# Patient Record
Sex: FEMALE | Race: BLACK OR AFRICAN AMERICAN | Employment: FULL TIME | URBAN - METROPOLITAN AREA
[De-identification: names, ages, dates, MRNs, and addresses within clinical notes are randomized per-mention and may not be internally consistent; named-entity substitution may affect disease eponyms.]

---

## 2018-01-30 LAB
HBV SURFACE AG SERPL QL IA: NON REACTIVE
HIV 1+2 AB+HIV1 P24 AG SERPL QL IA: NEGATIVE
RUBELLA ABY IGG: NORMAL
T PALLIDUM IGG SER QL: NON REACTIVE

## 2018-05-30 LAB — T PALLIDUM IGG SER QL: NON REACTIVE

## 2018-08-13 LAB — GROUP B STREP PCR: NEGATIVE

## 2018-09-17 ENCOUNTER — HOSPITAL ENCOUNTER (INPATIENT)
Facility: CLINIC | Age: 38
LOS: 2 days | Discharge: HOME HEALTH | End: 2018-09-19
Attending: FAMILY MEDICINE | Admitting: FAMILY MEDICINE
Payer: COMMERCIAL

## 2018-09-17 LAB
ABO + RH BLD: NORMAL
ABO + RH BLD: NORMAL
BLD GP AB SCN SERPL QL: NORMAL
BLOOD BANK CMNT PATIENT-IMP: NORMAL
HGB BLD-MCNC: 11.6 G/DL (ref 11.7–15.7)
SPECIMEN EXP DATE BLD: NORMAL

## 2018-09-17 PROCEDURE — 25000125 ZZHC RX 250: Performed by: FAMILY MEDICINE

## 2018-09-17 PROCEDURE — 86900 BLOOD TYPING SEROLOGIC ABO: CPT | Performed by: FAMILY MEDICINE

## 2018-09-17 PROCEDURE — 86850 RBC ANTIBODY SCREEN: CPT | Performed by: FAMILY MEDICINE

## 2018-09-17 PROCEDURE — 12000029 ZZH R&B OB INTERMEDIATE

## 2018-09-17 PROCEDURE — 86780 TREPONEMA PALLIDUM: CPT | Performed by: FAMILY MEDICINE

## 2018-09-17 PROCEDURE — 85018 HEMOGLOBIN: CPT | Performed by: FAMILY MEDICINE

## 2018-09-17 PROCEDURE — 86901 BLOOD TYPING SEROLOGIC RH(D): CPT | Performed by: FAMILY MEDICINE

## 2018-09-17 PROCEDURE — 25000128 H RX IP 250 OP 636: Performed by: FAMILY MEDICINE

## 2018-09-17 RX ORDER — OXYTOCIN 10 [USP'U]/ML
10 INJECTION, SOLUTION INTRAMUSCULAR; INTRAVENOUS
Status: DISCONTINUED | OUTPATIENT
Start: 2018-09-17 | End: 2018-09-18

## 2018-09-17 RX ORDER — IBUPROFEN 800 MG/1
800 TABLET, FILM COATED ORAL
Status: DISCONTINUED | OUTPATIENT
Start: 2018-09-17 | End: 2018-09-18

## 2018-09-17 RX ORDER — FENTANYL CITRATE 50 UG/ML
50-100 INJECTION, SOLUTION INTRAMUSCULAR; INTRAVENOUS
Status: DISCONTINUED | OUTPATIENT
Start: 2018-09-17 | End: 2018-09-18

## 2018-09-17 RX ORDER — SODIUM CHLORIDE, SODIUM LACTATE, POTASSIUM CHLORIDE, CALCIUM CHLORIDE 600; 310; 30; 20 MG/100ML; MG/100ML; MG/100ML; MG/100ML
INJECTION, SOLUTION INTRAVENOUS CONTINUOUS
Status: DISCONTINUED | OUTPATIENT
Start: 2018-09-17 | End: 2018-09-18

## 2018-09-17 RX ORDER — OXYTOCIN/0.9 % SODIUM CHLORIDE 30/500 ML
1-24 PLASTIC BAG, INJECTION (ML) INTRAVENOUS CONTINUOUS
Status: DISCONTINUED | OUTPATIENT
Start: 2018-09-17 | End: 2018-09-17

## 2018-09-17 RX ORDER — ONDANSETRON 2 MG/ML
4 INJECTION INTRAMUSCULAR; INTRAVENOUS EVERY 6 HOURS PRN
Status: DISCONTINUED | OUTPATIENT
Start: 2018-09-17 | End: 2018-09-18

## 2018-09-17 RX ORDER — CARBOPROST TROMETHAMINE 250 UG/ML
250 INJECTION, SOLUTION INTRAMUSCULAR
Status: DISCONTINUED | OUTPATIENT
Start: 2018-09-17 | End: 2018-09-18

## 2018-09-17 RX ORDER — ACETAMINOPHEN 325 MG/1
650 TABLET ORAL EVERY 4 HOURS PRN
Status: DISCONTINUED | OUTPATIENT
Start: 2018-09-17 | End: 2018-09-18

## 2018-09-17 RX ORDER — METHYLERGONOVINE MALEATE 0.2 MG/ML
200 INJECTION INTRAVENOUS
Status: DISCONTINUED | OUTPATIENT
Start: 2018-09-17 | End: 2018-09-18

## 2018-09-17 RX ORDER — OXYTOCIN/0.9 % SODIUM CHLORIDE 30/500 ML
1-24 PLASTIC BAG, INJECTION (ML) INTRAVENOUS CONTINUOUS
Status: DISCONTINUED | OUTPATIENT
Start: 2018-09-17 | End: 2018-09-18

## 2018-09-17 RX ORDER — OXYCODONE AND ACETAMINOPHEN 5; 325 MG/1; MG/1
1 TABLET ORAL
Status: DISCONTINUED | OUTPATIENT
Start: 2018-09-17 | End: 2018-09-18

## 2018-09-17 RX ORDER — NALOXONE HYDROCHLORIDE 0.4 MG/ML
.1-.4 INJECTION, SOLUTION INTRAMUSCULAR; INTRAVENOUS; SUBCUTANEOUS
Status: DISCONTINUED | OUTPATIENT
Start: 2018-09-17 | End: 2018-09-18

## 2018-09-17 RX ORDER — OXYTOCIN/0.9 % SODIUM CHLORIDE 30/500 ML
100-340 PLASTIC BAG, INJECTION (ML) INTRAVENOUS CONTINUOUS PRN
Status: COMPLETED | OUTPATIENT
Start: 2018-09-17 | End: 2018-09-18

## 2018-09-17 RX ADMIN — SODIUM CHLORIDE, POTASSIUM CHLORIDE, SODIUM LACTATE AND CALCIUM CHLORIDE: 600; 310; 30; 20 INJECTION, SOLUTION INTRAVENOUS at 14:05

## 2018-09-17 RX ADMIN — OXYTOCIN-SODIUM CHLORIDE 0.9% IV SOLN 30 UNIT/500ML 1 MILLI-UNITS/MIN: 30-0.9/5 SOLUTION at 14:40

## 2018-09-17 RX ADMIN — SODIUM CHLORIDE, POTASSIUM CHLORIDE, SODIUM LACTATE AND CALCIUM CHLORIDE: 600; 310; 30; 20 INJECTION, SOLUTION INTRAVENOUS at 16:33

## 2018-09-17 NOTE — IP AVS SNAPSHOT
[facility]partum    [address]    [facility] [address]    Phone:  [phone]    Fax:  [phone]                                       After Visit Summary   9/17/2018    Hiram Page    MRN: [id number]           After Visit Summary Signature Page     I have received my discharge instructions, and my questions have been answered. I have discussed any challenges I see with this plan with the nurse or doctor.    ..........................................................................................................................................  Patient/Patient Representative Signature      ..........................................................................................................................................  Patient Representative Print Name and Relationship to Patient    ..................................................               ................................................  Date                                   Time    ..........................................................................................................................................  Reviewed by Signature/Title    ...................................................              ..............................................  Date                                               Time          22EPIC Rev 08/18

## 2018-09-17 NOTE — IP AVS SNAPSHOT
MRN:[id number]                      After Visit Summary   9/17/2018    Hiram Page    MRN: [id number]           Thank you!     Thank you for choosing [facility] for your care. Our goal is always to provide you with excellent care. Hearing back from our patients is one way we can continue to improve our services. Please take a few minutes to complete the written survey that you may receive in the mail after you visit. If you would like to speak to someone directly about your visit please contact Patient Relations at [phone]. Thank you!          Patient Information     Date Of Birth          1980        About your hospital stay     You were admitted on:  September 17, 2018 You last received care in the:  [facility]    You were discharged on:  September 19, 2018        Reason for your hospital stay       Maternity care                  Who to Call     For medical emergencies, please call 911.  For non-urgent questions about your medical care, please call your primary care provider or clinic, [phone]          Attending Provider     Provider Specialty    Fiona Millan MD Family Practice       Primary Care Provider Office Phone # Fax #    Fiona Millan [phone]      After Care Instructions     Activity       Review discharge instructions            Diet       Resume previous diet            Discharge Instructions - Postpartum visit       Schedule postpartum visit with your provider and return to clinic in 8 weeks.                  Further instructions from your care team       Postpartum Vaginal Delivery Instructions    LACTATION [phone]    Activity       Ask family and friends for help when you need it.    Do not place anything in your vagina for 6 weeks.    You are not restricted on other activities, but take it easy for a few weeks to allow your body to recover from delivery.  You are able to do any activities you feel up to  that point.    No driving until you have stopped taking your pain medications (usually two weeks after delivery).     Call your health care provider if you have any of these symptoms:       Increased pain, swelling, redness, or fluid around your stiches from an episiotomy or perineal tear.    A fever above 100.4 F (38 C) with or without chills when placing a thermometer under your tongue.    You soak a sanitary pad with blood within 1 hour, or you see blood clots larger than a golf ball.    Bleeding that lasts more than 6 weeks.    Vaginal discharge that smells bad.    Severe pain, cramping or tenderness in your lower belly area.    A need to urinate more frequently (use the toilet more often), more urgently (use the toilet very quickly), or it burns when you urinate.    Nausea and vomiting.    Redness, swelling or pain around a vein in your leg.    Problems breastfeeding or a red or painful area on your breast.    Chest pain and cough or are gasping for air.    Problems coping with sadness, anxiety, or depression.  If you have any concerns about hurting yourself or the baby, call your provider immediately.     You have questions or concerns after you return home.     Keep your hands clean:  Always wash your hands before touching your perineal area and stitches.  This helps reduce your risk of infection.  If your hands aren't dirty, you may use an alcohol hand-rub to clean your hands. Keep your nails clean and short.        Pending Results     No orders found from 9/15/2018 to 9/18/2018.            Statement of Approval     Ordered          09/19/18 0726  I have reviewed and agree with all the recommendations and orders detailed in this document.  EFFECTIVE NOW     Approved and electronically signed by:  Fiona Millan MD             Admission Information     Date & Time Provider Department Dept. Phone    9/17/2018 Fiona Millan MD [facility] Postpartum [phone]      Your Vitals Were     Blood Pressure  "Pulse Temperature Respirations          106/52 80 97.7  F (36.5  C) (Oral) 16        MyChart Information     [facility] lets you send messages to your doctor, view your test results, renew your prescriptions, schedule appointments and more. To sign up, go to [email or web address] . Click on \"Log in\" on the left side of the screen, which will take you to the Welcome page. Then click on \"Sign up Now\" on the right side of the page.     You will be asked to enter the access code listed below, as well as some personal information. Please follow the directions to create your username and password.     Your access code is: [email or web address]  Expires: 2018 11:19 AM     Your access code will  in 90 days. If you need help or a new code, please call your [facility] clinic or [phone].        Care EveryWhere ID     This is your Care EveryWhere ID. This could be used by other organizations to access your [facility] medical records  [id number]        Equal Access to Services     [facility]: Hadii marcelo broussard [place], waaxda luqadaha, qaybta kaalmagabby sims, jesus caldwell . So [facility] [phone].    ATENCIÓN: Si habla español, tiene a anderson disposición servicios gratuitos de asistencia lingüística. Llame al [phone].    We comply with applicable federal civil rights laws and [place] laws. We do not discriminate on the basis of race, color, national origin, age, disability, sex, sexual orientation, or gender identity.               Review of your medicines      CONTINUE these medicines which have NOT CHANGED        Dose / Directions    prenatal multivitamin plus iron 27-0.8 MG Tabs per tablet   Indication:  Pregnancy        Dose:  1 tablet   Take 1 tablet by mouth daily   Refills:  0                Protect others around you: Learn how to safely use, store and throw away your medicines at www.disposemymeds.org.             Medication List: This is a list of all your medications and when " to take them. Check marks below indicate your daily home schedule. Keep this list as a reference.      Medications           Morning Afternoon Evening Bedtime As Needed    prenatal multivitamin plus iron 27-0.8 MG Tabs per tablet   Take 1 tablet by mouth daily

## 2018-09-17 NOTE — PROVIDER NOTIFICATION
09/17/18 1503   Provider Notification   Provider Name/Title Dr. ANIYA Millan   Method of Notification Phone   Updated Dr. WESTON Millan on 3 min PD fior down to 80--improved with O2, repositioning, and IVF bolus. Pitocin had just been started 2 minutes prior to deceleration--turned off. Pt having occasional painful contraction.     MD OK to keep Pitocin off and monitor for now. States she can come to hospital to assess in 2ish hours. OBGYN is backup next door. Update MD as needed.

## 2018-09-17 NOTE — PLAN OF CARE
Data: [facility]: 2018  1:26 PM.  Reason for maternal/fetal assessment is postdates induction of labor. Patient is a TOLAC. HX of successful TOLAC x2. Patient is a .  Prenatal record reviewed. Pregnancy has been uncomplicated.  Gestational Age 41w5d. VSS. Fetal movement present. Patient denies leaking of vaginal fluid/rupture of membranes, vaginal bleeding, nausea, vomiting, headache, visual disturbances, epigastric or URQ pain, significant edema. Support person is present.   Action: Verbal consent for EFM. Triage assessment completed. Bill of rights reviewed.  Response: Patient verbalized agreement with plan. Will contact Dr Fiona Millan with update and further orders.    Updated Dr. Millan on arrival of pt and above info. Per MD, pt is GBS negative. FHT's category I tracing with moderate variability. Occasional crampy contraction. MD orders for low dose Pitocin. MD states OBGYN is backup. MD states she can come to assess pt in 2.5 hours. Intrapartum orders received and initiated. POC reviewed with pt and SO--all questions answered.

## 2018-09-17 NOTE — PROVIDER NOTIFICATION
09/17/18 1716   Provider Notification   Provider Name/Title Dr. ANIYA Millan   Method of Notification At Bedside   Request Evaluate in Person   Notification Reason SVE;Membrane Status     MD at bedside for AROM for scant amount of clear, blood-tinged fluid. SVE unchanged 3.5/60/-3. MD would like to wait a few hours to see if labor progresses post AROM, re-check patient in 3 hours. Hold pitocin at this time. Dr. Richardson on-call backup surgeon at 1800.

## 2018-09-17 NOTE — H&P
Hiram Page is an 37 year old female. Brought in to L&D for medical induction at 41 5/7 wks gestation. Had been scheduled for induction last Thursday but patient had decided she didn't want to proceed. Risks of sudden IUFD were discussed and risk of uterine rupture. She is a  with h/o 1st delivery being  for fetal intolerance of labor and then 2 successful vbacs the last being in . Was seen in clinic today and had normal, reactive NST and agreed to induction today. She is here with her  and her  today. Last delivery was accomplished about 5 hours after AROM was done.   No significant change in general health status based on examination of the patient, review of Nursing Admission Database and prenatal record.    EFW: 9lb 8oz      Past Medical History:   Diagnosis Date     Vitamin D deficiency        Allergies: No Known Allergies    Active Problems:    Labor and delivery, indication for care    Blood pressure 127/70, temperature 97.9  F (36.6  C), temperature source Oral, resp. rate 18, unknown if currently breastfeeding.    ROS- normal    Physical Exam  Category 1 fetal tracing. Earlier today we had attempted small amount of pitocin as we couldn't perform AROM right away due to clinic and baby didn't tolerate the pitocin. Had prolonged decel for almost 3 minutes.  Contractions- occasional spontaneous  Cervix- 3/50%/-3   AROM done- no fluid return but I could feel membranes tearing   Assessment:  Postdates pregnancy  Pt desires TOLAC    Plan:  Hopefully AROM will be enough to kick things in. Discussed that if not kicking in in a couple hours, we may need to try small dose of pitocin again. Discussed with them option for repeat  as well. She would like to really try for another  but will be agreeable to  if baby doesn't tolerate this labor. Discussed with older placenta, that might be more likely to happen. We'll continue to watch.    Fiona Millan  MD  9/17/2018

## 2018-09-18 LAB — T PALLIDUM AB SER QL: NONREACTIVE

## 2018-09-18 PROCEDURE — 72200001 ZZH LABOR CARE VAGINAL DELIVERY SINGLE

## 2018-09-18 PROCEDURE — 25000128 H RX IP 250 OP 636: Performed by: FAMILY MEDICINE

## 2018-09-18 PROCEDURE — 10907ZC DRAINAGE OF AMNIOTIC FLUID, THERAPEUTIC FROM PRODUCTS OF CONCEPTION, VIA NATURAL OR ARTIFICIAL OPENING: ICD-10-PCS | Performed by: FAMILY MEDICINE

## 2018-09-18 PROCEDURE — 25000132 ZZH RX MED GY IP 250 OP 250 PS 637: Performed by: FAMILY MEDICINE

## 2018-09-18 PROCEDURE — 40000083 ZZH STATISTIC IP LACTATION SERVICES 1-15 MIN

## 2018-09-18 PROCEDURE — 12000031 ZZH R&B OB CRITICAL

## 2018-09-18 PROCEDURE — 25000125 ZZHC RX 250: Performed by: FAMILY MEDICINE

## 2018-09-18 RX ORDER — AMOXICILLIN 250 MG
1 CAPSULE ORAL 2 TIMES DAILY PRN
Status: DISCONTINUED | OUTPATIENT
Start: 2018-09-18 | End: 2018-09-19 | Stop reason: HOSPADM

## 2018-09-18 RX ORDER — BISACODYL 10 MG
10 SUPPOSITORY, RECTAL RECTAL DAILY PRN
Status: DISCONTINUED | OUTPATIENT
Start: 2018-09-20 | End: 2018-09-19 | Stop reason: HOSPADM

## 2018-09-18 RX ORDER — LANOLIN 100 %
OINTMENT (GRAM) TOPICAL
Status: DISCONTINUED | OUTPATIENT
Start: 2018-09-18 | End: 2018-09-19 | Stop reason: HOSPADM

## 2018-09-18 RX ORDER — NALOXONE HYDROCHLORIDE 0.4 MG/ML
.1-.4 INJECTION, SOLUTION INTRAMUSCULAR; INTRAVENOUS; SUBCUTANEOUS
Status: DISCONTINUED | OUTPATIENT
Start: 2018-09-18 | End: 2018-09-19 | Stop reason: HOSPADM

## 2018-09-18 RX ORDER — AMOXICILLIN 250 MG
2 CAPSULE ORAL 2 TIMES DAILY PRN
Status: DISCONTINUED | OUTPATIENT
Start: 2018-09-18 | End: 2018-09-19 | Stop reason: HOSPADM

## 2018-09-18 RX ORDER — HYDROCORTISONE 2.5 %
CREAM (GRAM) TOPICAL 3 TIMES DAILY PRN
Status: DISCONTINUED | OUTPATIENT
Start: 2018-09-18 | End: 2018-09-19 | Stop reason: HOSPADM

## 2018-09-18 RX ORDER — OXYTOCIN/0.9 % SODIUM CHLORIDE 30/500 ML
100 PLASTIC BAG, INJECTION (ML) INTRAVENOUS CONTINUOUS
Status: DISCONTINUED | OUTPATIENT
Start: 2018-09-18 | End: 2018-09-19 | Stop reason: CLARIF

## 2018-09-18 RX ORDER — IBUPROFEN 800 MG/1
800 TABLET, FILM COATED ORAL EVERY 6 HOURS PRN
Status: DISCONTINUED | OUTPATIENT
Start: 2018-09-18 | End: 2018-09-19 | Stop reason: HOSPADM

## 2018-09-18 RX ORDER — OXYTOCIN 10 [USP'U]/ML
10 INJECTION, SOLUTION INTRAMUSCULAR; INTRAVENOUS
Status: DISCONTINUED | OUTPATIENT
Start: 2018-09-18 | End: 2018-09-19 | Stop reason: HOSPADM

## 2018-09-18 RX ORDER — OXYTOCIN/0.9 % SODIUM CHLORIDE 30/500 ML
340 PLASTIC BAG, INJECTION (ML) INTRAVENOUS CONTINUOUS PRN
Status: DISCONTINUED | OUTPATIENT
Start: 2018-09-18 | End: 2018-09-19 | Stop reason: HOSPADM

## 2018-09-18 RX ORDER — ACETAMINOPHEN 325 MG/1
650 TABLET ORAL EVERY 4 HOURS PRN
Status: DISCONTINUED | OUTPATIENT
Start: 2018-09-18 | End: 2018-09-19 | Stop reason: HOSPADM

## 2018-09-18 RX ADMIN — FENTANYL CITRATE 100 MCG: 50 INJECTION INTRAMUSCULAR; INTRAVENOUS at 01:30

## 2018-09-18 RX ADMIN — SENNOSIDES AND DOCUSATE SODIUM 1 TABLET: 8.6; 5 TABLET ORAL at 18:53

## 2018-09-18 RX ADMIN — SODIUM CHLORIDE, POTASSIUM CHLORIDE, SODIUM LACTATE AND CALCIUM CHLORIDE: 600; 310; 30; 20 INJECTION, SOLUTION INTRAVENOUS at 00:50

## 2018-09-18 RX ADMIN — IBUPROFEN 800 MG: 800 TABLET ORAL at 18:53

## 2018-09-18 RX ADMIN — FENTANYL CITRATE 100 MCG: 50 INJECTION INTRAMUSCULAR; INTRAVENOUS at 00:25

## 2018-09-18 RX ADMIN — IBUPROFEN 800 MG: 800 TABLET ORAL at 03:39

## 2018-09-18 RX ADMIN — OXYTOCIN-SODIUM CHLORIDE 0.9% IV SOLN 30 UNIT/500ML 340 ML/HR: 30-0.9/5 SOLUTION at 02:41

## 2018-09-18 NOTE — PROVIDER NOTIFICATION
09/17/18 1945   Provider Notification   Provider Name/Title Dr Millan   Method of Notification Phone   Request Evaluate - Remote   Notification Reason Labor Status;Status Update

## 2018-09-18 NOTE — L&D DELIVERY NOTE
Delivery Summary with Adams  DELIVERY SUMMARY    Hiram Page MRN# [id number]   Age: 37 year old YOB: 1980     ASSESSMENT & PLAN: normal . Anticipate discharge to home in 2 days.    Adams Assessment Tool Data    Gestational Age:  Gestational Age: 41w6d     Maternal temperature range:  Temp  Av.2  F (36.8  C)  Min: 97.9  F (36.6  C)  Max: 98.6  F (37  C)    Membranes ruptured for:   9h 12m     GBS status:  Lab Results   Component Value Date    GBS negative 06/15/2016            Labor Event Times    Labor onset date:  18 Onset time:   1:45 AM   Dilation complete date:  18 Complete time:   2:20 AM   Start pushing date/time:  2018 0230            Labor Length    1st Stage (hrs):  0 (min):  35   2nd Stage (hrs):  0 (min):  16      Labor Events     labor?:  No    steroids:  None   Labor Type:  Induction, AROM      Antibiotics received during labor?:  No      Rupture date/time: 18 1724   Rupture type:  Artificial Rupture of Membranes   Fluid color:  Clear      Induction:  AROM   Induction date/time:     Cervical ripening date/time:     Indications for induction:  Post-term Gestation      1:1 continuous labor support provided by?:  ANAND glynn          Delivery/Placenta Date and Time    Delivery Date:  18 Delivery Time:   2:36 AM   Oxytocin given at the time of delivery:  after delivery of baby      Vaginal Counts    Initial count performed by 2 team members:   Two Team Members   Dr. Monty MOSHER RN          [employer]   Initial counts 2  5    Added to count       Final counts 2  5       Placed during labor Accounted for at the end of labor   NA    Yes    NA       Final count performed by 2 team members:   Two Team Members   Dr. Monty MOSHER RN         Final count correct?:  Yes         Apgars    Living status:  Living    1 Minute 5 Minute 10 Minute 15 Minute 20 Minute   Skin color: 0  1       Heart rate: 2  2      "  Reflex irritability: 2  2       Muscle tone: 2  2       Respiratory effort: 1  2       Total: 7  9          Apgars assigned by:  RONNIE ALVA,CNP      Cord    Vessels:  3 Vessels    Cord Blood Disposition:  Lab          [place] Resuscitation    Methods:  Suctioning       Care at Delivery:  Called to attend vaginal delivery of this 40 6/7 week infant by Dr. Millan secondary to fetal heart rate decelerations.  Infant was born and was placed on mother's abdomen.  Initially made a gasp, but did not make more respiratory effort.  Appeared to have good tone and was being dried and stimulated; was brought to prewarmed radiant warmer after 1 min 30 seconds of age when cord was clamped. Continued to stimulate and he began to have vigorous cry.  Was deep suctioned for course breath sounds and had thick meconium stained fluid.  Continued to be vigorous and color was pink.  After 5 minutes of age, was left in the care of the labor and delivery RN.      Latonya Sher, NNP-BC 2018 2:53 AM     Output in Delivery Room:  Stool      [place] Measurements    Weight:  9 lb 2.4 oz Length:  1' 8.5\"   Head circumference:  35.6 cm       Labor Events and Shoulder Dystocia    Fetal Tracing Prior to Delivery:  Category 2   Shoulder dystocia present?:  Neg            Delivery (Maternal) (Provider to Complete) (218354)    Episiotomy:  None   Perineal lacerations:  1st Repaired?:  No   Vaginal laceration?:  No    Cervical laceration?:  No    Est. blood loss (mL):  100         Mother's Information  Mother: Hiram Page #[id number]    Start of Mother's Information     IO Blood Loss  18 0145 - 18 0259    Mom's I/O Activity            End of Mother's Information  Mother: Hiram Page #[id number]            Delivery - Provider to Complete ([id number])    Delivering clinician:  ROBYN MILLAN   Attempted Delivery Types (Choose all that apply):  Spontaneous Vaginal Delivery   Delivery Type (Choose the 1 that will go to the " Birth History):  Vaginal, Spontaneous Delivery                           Placenta    Delayed Cord Clamping:  Done   Removal:  Spontaneous   Disposition:  Hospital disposal      Anesthesia    Method:  INTRAVENOUS REGIONAL         Presentation and Position    Presentation:  Vertex    Occiput Anterior                    Fiona Millan MD

## 2018-09-18 NOTE — PLAN OF CARE
Problem: Patient Care Overview  Goal: Plan of Care/Patient Progress Review  Outcome: No Change  Pt able to get some rest between cares w/  in NBN shortly after transfer to unit.  States she is comfortable; denies need for further pain medications at this time.  Using ice pack to divina area.  Ambulating independently; voiding w/o difficulty.  SL patent.  FOB present and supportive.  Continue to monitor.

## 2018-09-18 NOTE — PROVIDER NOTIFICATION
09/18/18 0200   Provider Notification   Provider Name/Title Dr. WESTON Millan   Method of Notification Phone   Request Attend Delivery   Notification Reason Status Update   Updated with patient's involuntary pushing with contractions.  Will come to hospital for delivery

## 2018-09-18 NOTE — PROVIDER NOTIFICATION
09/17/18 2135   Provider Notification   Provider Name/Title Dr. ANIYA Millan   Method of Notification Phone   Request Evaluate - Remote   Notification Reason Status Update;SVE;Uterine Activity     MD updated on unchanged SVE 3.5/60/-3. Pitocin started at 2105 and IUPC placed per physician order. UC every 5-9 minutes per IUPC and currently have a category 1 FHT. Will update MD with any changes or fetal intolerance.

## 2018-09-18 NOTE — PLAN OF CARE
Problem: Patient Care Overview  Goal: Plan of Care/Patient Progress Review  Patient up and around room voiding without complications   Denies pain -working on breastfeeding baby

## 2018-09-18 NOTE — PLAN OF CARE
Problem: Postpartum (Vaginal Delivery) (Adult,Obstetrics,Pediatric)  Goal: Signs and Symptoms of Listed Potential Problems Will be Absent, Minimized or Managed (Postpartum)  Signs and symptoms of listed potential problems will be absent, minimized or managed by discharge/transition of care (reference Postpartum (Vaginal Delivery) (Adult,Obstetrics,Pediatric) CPG).   Outcome: Improving  Patient is independent with her cares, she is up to the bathroom without problems, voiding with difficulty. She denies pain, I reminded her when she could have the next Ibuprofen, and reviewed it on the white board. Patient is a experienced mother and is comfortable with infant cares. The father of the baby came and brought mother food of her choice. I made sure the patient would not want or need a  and both agreed they understand completely.

## 2018-09-18 NOTE — LACTATION NOTE
REY to see patient.  Her baby has been nursing well.  She has no questions or concerns and reports successfully nursing her other children.  She is also using formula per her preference.

## 2018-09-18 NOTE — PLAN OF CARE
Data: Hiram Page transferred to 451 via wheelchair at 0451. Baby transferred via parent's arms.  Action: Receiving unit notified of transfer: Yes. Patient and family notified of room change. Report given to ANAND Bernabe at 0456. Belongings sent to receiving unit. Accompanied by Registered Nurse. Oriented patient to surroundings. Call light within reach. ID bands double-checked with receiving RN.  Response: Patient tolerated transfer and is stable.

## 2018-09-19 VITALS
TEMPERATURE: 97.7 F | RESPIRATION RATE: 16 BRPM | DIASTOLIC BLOOD PRESSURE: 52 MMHG | SYSTOLIC BLOOD PRESSURE: 106 MMHG | HEART RATE: 80 BPM

## 2018-09-19 PROCEDURE — 90686 IIV4 VACC NO PRSV 0.5 ML IM: CPT | Performed by: FAMILY MEDICINE

## 2018-09-19 PROCEDURE — 25000128 H RX IP 250 OP 636: Performed by: FAMILY MEDICINE

## 2018-09-19 PROCEDURE — 25000132 ZZH RX MED GY IP 250 OP 250 PS 637: Performed by: FAMILY MEDICINE

## 2018-09-19 RX ADMIN — INFLUENZA A VIRUS A/MICHIGAN/45/2015 X-275 (H1N1) ANTIGEN (FORMALDEHYDE INACTIVATED), INFLUENZA A VIRUS A/SINGAPORE/INFIMH-16-0019/2016 IVR-186 (H3N2) ANTIGEN (FORMALDEHYDE INACTIVATED), INFLUENZA B VIRUS B/PHUKET/3073/2013 ANTIGEN (FORMALDEHYDE INACTIVATED), AND INFLUENZA B VIRUS B/MARYLAND/15/2016 BX-69A ANTIGEN (FORMALDEHYDE INACTIVATED) 0.5 ML: 15; 15; 15; 15 INJECTION, SUSPENSION INTRAMUSCULAR at 10:19

## 2018-09-19 RX ADMIN — SENNOSIDES AND DOCUSATE SODIUM 1 TABLET: 8.6; 5 TABLET ORAL at 07:51

## 2018-09-19 NOTE — PLAN OF CARE
Problem: Patient Care Overview  Goal: Plan of Care/Patient Progress Review  Outcome: No Change  Pt doing well and independent with self and baby cares. Ready to discharge this afternoon when  arrives. Will have home care as early discharge. Breast and bottle feeding.

## 2018-09-19 NOTE — PLAN OF CARE
Problem: Patient Care Overview  Goal: Plan of Care/Patient Progress Review  Outcome: Improving  Patient stable this shift. Motrin given once this evening. Resting between visitors and feeds.

## 2018-09-19 NOTE — DISCHARGE SUMMARY
Doing well.  Pain is adequately controlled.  No fevers.  No history of foul-smelling vaginal discharge.  Good appetite.  Denies chest pain, shortness of breath, nausea or vomiting.  Vaginal bleeding is similar to a heavy menstrual flow.  Breastfeeding well. Home today. F/up in 8 weeks. iFona Millan MD

## 2018-09-19 NOTE — DOWNTIME EVENT NOTE
The EMR was down for 2.5 hours on 9/19/2018.    KEI Don was responsible for completing the paper charting during this time period.     The following information was re-entered into the system by Florecita Stone: Flowsheet data    The following information will remain in the paper chart: none    Florecita Stone  9/19/2018

## 2018-09-19 NOTE — DISCHARGE INSTRUCTIONS
Postpartum Vaginal Delivery Instructions    LACTATION [phone]    Activity       Ask family and friends for help when you need it.    Do not place anything in your vagina for 6 weeks.    You are not restricted on other activities, but take it easy for a few weeks to allow your body to recover from delivery.  You are able to do any activities you feel up to that point.    No driving until you have stopped taking your pain medications (usually two weeks after delivery).     Call your health care provider if you have any of these symptoms:       Increased pain, swelling, redness, or fluid around your stiches from an episiotomy or perineal tear.    A fever above 100.4 F (38 C) with or without chills when placing a thermometer under your tongue.    You soak a sanitary pad with blood within 1 hour, or you see blood clots larger than a golf ball.    Bleeding that lasts more than 6 weeks.    Vaginal discharge that smells bad.    Severe pain, cramping or tenderness in your lower belly area.    A need to urinate more frequently (use the toilet more often), more urgently (use the toilet very quickly), or it burns when you urinate.    Nausea and vomiting.    Redness, swelling or pain around a vein in your leg.    Problems breastfeeding or a red or painful area on your breast.    Chest pain and cough or are gasping for air.    Problems coping with sadness, anxiety, or depression.  If you have any concerns about hurting yourself or the baby, call your provider immediately.     You have questions or concerns after you return home.     Keep your hands clean:  Always wash your hands before touching your perineal area and stitches.  This helps reduce your risk of infection.  If your hands aren't dirty, you may use an alcohol hand-rub to clean your hands. Keep your nails clean and short.

## 2018-09-19 NOTE — PLAN OF CARE
Problem: Patient Care Overview  Goal: Plan of Care/Patient Progress Review  Outcome: Improving  Pt able to get some rest between cares w/  rooming-in for feedings.  Declining pain medications at this time.  Voiding w/o difficulty.  Independent w/ self and baby cares.  FOB present and supportive.  Anticipate discharge later today.

## 2021-03-09 LAB
HEPATITIS B SURFACE ANTIGEN (EXTERNAL): NEGATIVE
HIV1+2 AB SERPL QL IA: NEGATIVE
RUBELLA ANTIBODY IGG (EXTERNAL): NORMAL

## 2021-08-19 LAB — GROUP B STREPTOCOCCUS (EXTERNAL): NEGATIVE

## 2021-09-10 ENCOUNTER — HOSPITAL ENCOUNTER (INPATIENT)
Facility: CLINIC | Age: 41
LOS: 2 days | Discharge: HOME | End: 2021-09-12
Attending: OBSTETRICS & GYNECOLOGY | Admitting: OBSTETRICS & GYNECOLOGY
Payer: COMMERCIAL

## 2021-09-10 LAB
ABO/RH(D): NORMAL
ANTIBODY SCREEN: NEGATIVE
HGB BLD-MCNC: 11.6 G/DL (ref 11.7–15.7)
SARS-COV-2 RNA RESP QL NAA+PROBE: NEGATIVE
SPECIMEN EXPIRATION DATE: NORMAL

## 2021-09-10 PROCEDURE — 85018 HEMOGLOBIN: CPT | Performed by: OBSTETRICS & GYNECOLOGY

## 2021-09-10 PROCEDURE — 722N000001 HC LABOR CARE VAGINAL DELIVERY SINGLE

## 2021-09-10 PROCEDURE — 250N000011 HC RX IP 250 OP 636: Performed by: OBSTETRICS & GYNECOLOGY

## 2021-09-10 PROCEDURE — 87635 SARS-COV-2 COVID-19 AMP PRB: CPT | Performed by: OBSTETRICS & GYNECOLOGY

## 2021-09-10 PROCEDURE — 258N000003 HC RX IP 258 OP 636: Performed by: OBSTETRICS & GYNECOLOGY

## 2021-09-10 PROCEDURE — 250N000009 HC RX 250: Performed by: OBSTETRICS & GYNECOLOGY

## 2021-09-10 PROCEDURE — 86780 TREPONEMA PALLIDUM: CPT | Performed by: OBSTETRICS & GYNECOLOGY

## 2021-09-10 PROCEDURE — 86901 BLOOD TYPING SEROLOGIC RH(D): CPT | Performed by: OBSTETRICS & GYNECOLOGY

## 2021-09-10 PROCEDURE — 120N000001 HC R&B MED SURG/OB

## 2021-09-10 PROCEDURE — 3E033VJ INTRODUCTION OF OTHER HORMONE INTO PERIPHERAL VEIN, PERCUTANEOUS APPROACH: ICD-10-PCS | Performed by: OBSTETRICS & GYNECOLOGY

## 2021-09-10 PROCEDURE — 10907ZC DRAINAGE OF AMNIOTIC FLUID, THERAPEUTIC FROM PRODUCTS OF CONCEPTION, VIA NATURAL OR ARTIFICIAL OPENING: ICD-10-PCS | Performed by: OBSTETRICS & GYNECOLOGY

## 2021-09-10 PROCEDURE — 36415 COLL VENOUS BLD VENIPUNCTURE: CPT | Performed by: OBSTETRICS & GYNECOLOGY

## 2021-09-10 RX ORDER — IBUPROFEN 800 MG/1
800 TABLET, FILM COATED ORAL EVERY 6 HOURS PRN
Status: DISCONTINUED | OUTPATIENT
Start: 2021-09-10 | End: 2021-09-12 | Stop reason: HOSPADM

## 2021-09-10 RX ORDER — MISOPROSTOL 200 UG/1
400 TABLET ORAL
Status: DISCONTINUED | OUTPATIENT
Start: 2021-09-10 | End: 2021-09-12 | Stop reason: HOSPADM

## 2021-09-10 RX ORDER — MISOPROSTOL 200 UG/1
400 TABLET ORAL
Status: DISCONTINUED | OUTPATIENT
Start: 2021-09-10 | End: 2021-09-10 | Stop reason: HOSPADM

## 2021-09-10 RX ORDER — NALOXONE HYDROCHLORIDE 0.4 MG/ML
0.2 INJECTION, SOLUTION INTRAMUSCULAR; INTRAVENOUS; SUBCUTANEOUS
Status: DISCONTINUED | OUTPATIENT
Start: 2021-09-10 | End: 2021-09-12 | Stop reason: HOSPADM

## 2021-09-10 RX ORDER — NALOXONE HYDROCHLORIDE 0.4 MG/ML
0.4 INJECTION, SOLUTION INTRAMUSCULAR; INTRAVENOUS; SUBCUTANEOUS
Status: DISCONTINUED | OUTPATIENT
Start: 2021-09-10 | End: 2021-09-12 | Stop reason: HOSPADM

## 2021-09-10 RX ORDER — OXYTOCIN/0.9 % SODIUM CHLORIDE 30/500 ML
1-24 PLASTIC BAG, INJECTION (ML) INTRAVENOUS CONTINUOUS
Status: DISCONTINUED | OUTPATIENT
Start: 2021-09-10 | End: 2021-09-10 | Stop reason: HOSPADM

## 2021-09-10 RX ORDER — PROCHLORPERAZINE 25 MG
25 SUPPOSITORY, RECTAL RECTAL EVERY 12 HOURS PRN
Status: DISCONTINUED | OUTPATIENT
Start: 2021-09-10 | End: 2021-09-10 | Stop reason: HOSPADM

## 2021-09-10 RX ORDER — METOCLOPRAMIDE HYDROCHLORIDE 5 MG/ML
10 INJECTION INTRAMUSCULAR; INTRAVENOUS EVERY 6 HOURS PRN
Status: DISCONTINUED | OUTPATIENT
Start: 2021-09-10 | End: 2021-09-10 | Stop reason: HOSPADM

## 2021-09-10 RX ORDER — CARBOPROST TROMETHAMINE 250 UG/ML
250 INJECTION, SOLUTION INTRAMUSCULAR
Status: DISCONTINUED | OUTPATIENT
Start: 2021-09-10 | End: 2021-09-12 | Stop reason: HOSPADM

## 2021-09-10 RX ORDER — NALOXONE HYDROCHLORIDE 0.4 MG/ML
0.2 INJECTION, SOLUTION INTRAMUSCULAR; INTRAVENOUS; SUBCUTANEOUS
Status: DISCONTINUED | OUTPATIENT
Start: 2021-09-10 | End: 2021-09-10 | Stop reason: HOSPADM

## 2021-09-10 RX ORDER — FENTANYL CITRATE 50 UG/ML
50-100 INJECTION, SOLUTION INTRAMUSCULAR; INTRAVENOUS
Status: DISCONTINUED | OUTPATIENT
Start: 2021-09-10 | End: 2021-09-10 | Stop reason: HOSPADM

## 2021-09-10 RX ORDER — KETOROLAC TROMETHAMINE 30 MG/ML
30 INJECTION, SOLUTION INTRAMUSCULAR; INTRAVENOUS
Status: DISCONTINUED | OUTPATIENT
Start: 2021-09-10 | End: 2021-09-12 | Stop reason: HOSPADM

## 2021-09-10 RX ORDER — OXYTOCIN 10 [USP'U]/ML
10 INJECTION, SOLUTION INTRAMUSCULAR; INTRAVENOUS
Status: DISCONTINUED | OUTPATIENT
Start: 2021-09-10 | End: 2021-09-12 | Stop reason: HOSPADM

## 2021-09-10 RX ORDER — SODIUM CHLORIDE, SODIUM LACTATE, POTASSIUM CHLORIDE, CALCIUM CHLORIDE 600; 310; 30; 20 MG/100ML; MG/100ML; MG/100ML; MG/100ML
INJECTION, SOLUTION INTRAVENOUS CONTINUOUS
Status: DISCONTINUED | OUTPATIENT
Start: 2021-09-10 | End: 2021-09-12 | Stop reason: HOSPADM

## 2021-09-10 RX ORDER — LIDOCAINE 40 MG/G
CREAM TOPICAL
Status: DISCONTINUED | OUTPATIENT
Start: 2021-09-10 | End: 2021-09-10 | Stop reason: HOSPADM

## 2021-09-10 RX ORDER — ONDANSETRON 4 MG/1
4 TABLET, ORALLY DISINTEGRATING ORAL EVERY 6 HOURS PRN
Status: DISCONTINUED | OUTPATIENT
Start: 2021-09-10 | End: 2021-09-10 | Stop reason: HOSPADM

## 2021-09-10 RX ORDER — BISACODYL 10 MG
10 SUPPOSITORY, RECTAL RECTAL DAILY PRN
Status: DISCONTINUED | OUTPATIENT
Start: 2021-09-10 | End: 2021-09-12 | Stop reason: HOSPADM

## 2021-09-10 RX ORDER — NALOXONE HYDROCHLORIDE 0.4 MG/ML
0.4 INJECTION, SOLUTION INTRAMUSCULAR; INTRAVENOUS; SUBCUTANEOUS
Status: DISCONTINUED | OUTPATIENT
Start: 2021-09-10 | End: 2021-09-10 | Stop reason: HOSPADM

## 2021-09-10 RX ORDER — OXYTOCIN/0.9 % SODIUM CHLORIDE 30/500 ML
340 PLASTIC BAG, INJECTION (ML) INTRAVENOUS CONTINUOUS PRN
Status: DISCONTINUED | OUTPATIENT
Start: 2021-09-10 | End: 2021-09-12 | Stop reason: HOSPADM

## 2021-09-10 RX ORDER — ONDANSETRON 2 MG/ML
4 INJECTION INTRAMUSCULAR; INTRAVENOUS EVERY 6 HOURS PRN
Status: DISCONTINUED | OUTPATIENT
Start: 2021-09-10 | End: 2021-09-10 | Stop reason: HOSPADM

## 2021-09-10 RX ORDER — METHYLERGONOVINE MALEATE 0.2 MG/ML
200 INJECTION INTRAVENOUS
Status: DISCONTINUED | OUTPATIENT
Start: 2021-09-10 | End: 2021-09-10 | Stop reason: HOSPADM

## 2021-09-10 RX ORDER — OXYTOCIN 10 [USP'U]/ML
10 INJECTION, SOLUTION INTRAMUSCULAR; INTRAVENOUS
Status: DISCONTINUED | OUTPATIENT
Start: 2021-09-10 | End: 2021-09-10 | Stop reason: HOSPADM

## 2021-09-10 RX ORDER — MISOPROSTOL 200 UG/1
800 TABLET ORAL
Status: DISCONTINUED | OUTPATIENT
Start: 2021-09-10 | End: 2021-09-10 | Stop reason: HOSPADM

## 2021-09-10 RX ORDER — OXYCODONE HYDROCHLORIDE 5 MG/1
5 TABLET ORAL EVERY 4 HOURS PRN
Status: DISCONTINUED | OUTPATIENT
Start: 2021-09-10 | End: 2021-09-12 | Stop reason: HOSPADM

## 2021-09-10 RX ORDER — METHYLERGONOVINE MALEATE 0.2 MG/ML
200 INJECTION INTRAVENOUS
Status: DISCONTINUED | OUTPATIENT
Start: 2021-09-10 | End: 2021-09-12 | Stop reason: HOSPADM

## 2021-09-10 RX ORDER — TRANEXAMIC ACID 10 MG/ML
1 INJECTION, SOLUTION INTRAVENOUS EVERY 30 MIN PRN
Status: DISCONTINUED | OUTPATIENT
Start: 2021-09-10 | End: 2021-09-12 | Stop reason: HOSPADM

## 2021-09-10 RX ORDER — OXYTOCIN/0.9 % SODIUM CHLORIDE 30/500 ML
340 PLASTIC BAG, INJECTION (ML) INTRAVENOUS CONTINUOUS PRN
Status: DISCONTINUED | OUTPATIENT
Start: 2021-09-10 | End: 2021-09-10 | Stop reason: HOSPADM

## 2021-09-10 RX ORDER — IBUPROFEN 600 MG/1
600 TABLET, FILM COATED ORAL
Status: DISCONTINUED | OUTPATIENT
Start: 2021-09-10 | End: 2021-09-12 | Stop reason: HOSPADM

## 2021-09-10 RX ORDER — MODIFIED LANOLIN
OINTMENT (GRAM) TOPICAL
Status: DISCONTINUED | OUTPATIENT
Start: 2021-09-10 | End: 2021-09-12 | Stop reason: HOSPADM

## 2021-09-10 RX ORDER — METOCLOPRAMIDE 10 MG/1
10 TABLET ORAL EVERY 6 HOURS PRN
Status: DISCONTINUED | OUTPATIENT
Start: 2021-09-10 | End: 2021-09-10 | Stop reason: HOSPADM

## 2021-09-10 RX ORDER — HYDROCORTISONE 2.5 %
CREAM (GRAM) TOPICAL 3 TIMES DAILY PRN
Status: DISCONTINUED | OUTPATIENT
Start: 2021-09-10 | End: 2021-09-12 | Stop reason: HOSPADM

## 2021-09-10 RX ORDER — OXYTOCIN/0.9 % SODIUM CHLORIDE 30/500 ML
100-340 PLASTIC BAG, INJECTION (ML) INTRAVENOUS CONTINUOUS PRN
Status: DISCONTINUED | OUTPATIENT
Start: 2021-09-10 | End: 2021-09-12 | Stop reason: HOSPADM

## 2021-09-10 RX ORDER — CARBOPROST TROMETHAMINE 250 UG/ML
250 INJECTION, SOLUTION INTRAMUSCULAR
Status: DISCONTINUED | OUTPATIENT
Start: 2021-09-10 | End: 2021-09-10 | Stop reason: HOSPADM

## 2021-09-10 RX ORDER — TRANEXAMIC ACID 10 MG/ML
1 INJECTION, SOLUTION INTRAVENOUS EVERY 30 MIN PRN
Status: DISCONTINUED | OUTPATIENT
Start: 2021-09-10 | End: 2021-09-10 | Stop reason: HOSPADM

## 2021-09-10 RX ORDER — MISOPROSTOL 200 UG/1
800 TABLET ORAL
Status: DISCONTINUED | OUTPATIENT
Start: 2021-09-10 | End: 2021-09-12 | Stop reason: HOSPADM

## 2021-09-10 RX ORDER — PROCHLORPERAZINE MALEATE 10 MG
10 TABLET ORAL EVERY 6 HOURS PRN
Status: DISCONTINUED | OUTPATIENT
Start: 2021-09-10 | End: 2021-09-10 | Stop reason: HOSPADM

## 2021-09-10 RX ORDER — ACETAMINOPHEN 325 MG/1
650 TABLET ORAL EVERY 4 HOURS PRN
Status: DISCONTINUED | OUTPATIENT
Start: 2021-09-10 | End: 2021-09-12 | Stop reason: HOSPADM

## 2021-09-10 RX ORDER — DOCUSATE SODIUM 100 MG/1
100 CAPSULE, LIQUID FILLED ORAL DAILY
Status: DISCONTINUED | OUTPATIENT
Start: 2021-09-11 | End: 2021-09-12 | Stop reason: HOSPADM

## 2021-09-10 RX ADMIN — Medication 2 MILLI-UNITS/MIN: at 17:51

## 2021-09-10 RX ADMIN — FENTANYL CITRATE 100 MCG: 50 INJECTION, SOLUTION INTRAMUSCULAR; INTRAVENOUS at 20:45

## 2021-09-10 RX ADMIN — SODIUM CHLORIDE, POTASSIUM CHLORIDE, SODIUM LACTATE AND CALCIUM CHLORIDE: 600; 310; 30; 20 INJECTION, SOLUTION INTRAVENOUS at 18:00

## 2021-09-10 RX ADMIN — Medication 4 MILLI-UNITS/MIN: at 18:30

## 2021-09-10 RX ADMIN — SODIUM CHLORIDE, POTASSIUM CHLORIDE, SODIUM LACTATE AND CALCIUM CHLORIDE 500 ML: 600; 310; 30; 20 INJECTION, SOLUTION INTRAVENOUS at 18:32

## 2021-09-10 NOTE — PLAN OF CARE
Data: [facility] presented [facility]: 9/10/2021  4:51 PM.  Reason for maternal/fetal assessment is follow up from [facility] .  Patient is a .  Prenatal record reviewed. Pregnancy has been uncomplicated..  Gestational Age 40w6d. VSS. Fetal movement active. Patient denies uterine contractions, leaking of vaginal fluid/rupture of membranes, vaginal bleeding, abdominal pain, pelvic pressure, nausea, vomiting, headache, visual disturbances, epigastric or URQ pain, significant edema. Support person () is present.   Action: Verbal consent for EFM. Triage assessment completed. Bill of rights reviewed.  Response: Patient verbalized agreement with plan. Will contact Dr Pooja Lynch with update and for further orders.

## 2021-09-10 NOTE — H&P
"  September 10, 2021    Hiram Page  [id number]      OB Admit History & Physical      Ms. Page  is here due to non reassuring  testing with a BPP score of 4/8.    She was called today due to BPP score and recommended to direct herself immediately to L&D. Pt was skeptical per notes. Please see care everywhere for further details.     Upon encounter pt states she is amenable for induction based on discussion regarding poor BPP score of 4/8 and the high risk for fetal stress/death. Denies any leaking of fluid, abnormal vaginal discharge. States +FM. She is requesting TOLAC since she has hx of 3 prior successful 's.       No LMP recorded. Patient is pregnant.   Her Estimated Date of Delivery: Sep 4, 2021, making her 40w6d.      Estimated body mass index is 29.95 kg/m  as calculated from the following:    Height as of 16: 1.651 m (5' 5\").    Weight as of 16: 81.6 kg (180 lb).     Pregnancy complications:  -Thrombocytopenia  - History of CS x1,  x3  - AMA  - Scant prenatal care   - Poor pregnancy dating  - Insulin resistance  - Son with autism  - History of LGA infant     She is a 40 year old   Her OB history:   OB History    Para Term  AB Living   6 4 4 0 0 4   SAB TAB Ectopic Multiple Live Births   0 0 0 0 4      # Outcome Date GA Lbr Zac/2nd Weight Sex Delivery Anes PTL Lv   6 Current            5 Term 18 41w6d 00:35 / 00:16 4.15 kg (9 lb 2.4 oz) M Vag-Spont IV N SAFIA      Name: REYNALDO PAGE1 HIRAM      Apgar1: 7  Apgar5: 9   4 Term 16 41w3d 12:50 / 01:02 4.38 kg (9 lb 10.5 oz) M  IV  SAFIA      Name: TRISTIAN BANERJEE      Apgar1: 2  Apgar5: 5   3             2 Term         SAFIA   1 Term         SAFIA            Past Medical History:   Diagnosis Date     Vitamin D deficiency           Past Surgical History:   Procedure Laterality Date      SECTION           No current outpatient medications on file.       Allergies: Patient has no " known allergies.      REVIEW OF SYSTEMS:  NEUROLOGIC:  Negative  EYES:  Negative  ENT:  Negative  GI:  Negative  :  Negative  GYN:  Negative  CV:  Negative  PULMONARY:  Negative  MUSCULOSKELETAL:  Negative  PSYCH:  Negative      Social History     Socioeconomic History     Marital status:      Spouse name: Not on file     Number of children: Not on file     Years of education: Not on file     Highest education level: Not on file   Occupational History     Not on file   Tobacco Use     Smoking status: Never Smoker     Smokeless tobacco: Never Used   Substance and Sexual Activity     Alcohol use: No     Drug use: No     Sexual activity: Yes   Other Topics Concern     Not on file   Social History Narrative     Not on file     Social Determinants of Health     Financial Resource Strain:      Difficulty of Paying Living Expenses:    Food Insecurity:      Worried About Running Out of Food in the Last Year:      Ran Out of Food in the Last Year:    Transportation Needs:      Lack of Transportation (Medical):      Lack of Transportation (Non-Medical):    Physical Activity:      Days of Exercise per Week:      Minutes of Exercise per Session:    Stress:      Feeling of Stress :    Social Connections:      Frequency of Communication with Friends and Family:      Frequency of Social Gatherings with Friends and Family:      Attends Muslim Services:      Active Member of Clubs or Organizations:      Attends Club or Organization Meetings:      Marital Status:    Intimate Partner Violence:      Fear of Current or Ex-Partner:      Emotionally Abused:      Physically Abused:      Sexually Abused:       History reviewed. No pertinent family history.      Exam:    Vitals:   There were no vitals taken for this visit.  Hutchins:  Irritable, no contraction pattern  FHT: 150 bpm, mod, a +, d -    Alert Awake in NAD  HEENT grossly normal  Neck: no lymphadenopathy or thryoidomegaly  Lungs CTAB  Back No CVAT  Heart RR  ABD gravid,  NABS, soft, NT on exam with NT fundus palpable  Cervix is 4/80/-3, posterior, well applied, AROM done  EXT:  no edema, no calf tenderness      ULTRASOUNDS  FINDINGS:  Type of Gestation: [place]  Fetal Presentation:  Cephalic  Cardiac Rate:  138 BPM   Placental Position:  Anterior.  4-quadrant CARROLL:  6.2 cm.  Q1:  3.9 cm.  Q2:  0.0 cm.  Q3:  0.0 cm.  Q4:  2.2 cm.      Other Findings: None.    BPP   Fetal breathin (One episode of fetal breathing for 30 seconds)   Gross fetal movements:  0 (3 body, limb, discrete or separate movements)  Fetal tone:  0 (One episode of extension/flexion of limbs, head or trunk)  Amniotic fluid single deepest pocket:  2 (greater than or equal to 2 cm)    TOTAL SCORE: 4    Umbilical artery ratio: 2.1-2.5, normal.    Assessment/Plan: Hiram Page is a 40 year old  at 40w6d GA  here with non reassuring  test (BPP ).     INTRAPARTUM  Prenatal Care:  -OB labs reviewed: O, Rubella immune, HIV neg, Heb B nonreactive, Hep C negative, RPR negative  -Genetics: declined  -Ultrasound: L2 US with normal anatomy and echo  - Rh positive, Rhogam not indicated  - 3rd tri labs: , Hgb 10.8, plt 144, RPR neg  - Declined Tdap, Declines Covid vaccination   - GBS negative  - Feeding: breast and formula   - Contraception: POPs     Thrombocytopenia  - plts of 121 noted in 2021, platelets were also low in last pregnancy  - last platelet count 122 ()    Insulin resistance  - Hgb A1c of 5.8 at NOB visit  - passed 1 hr GCT x2    AMA  - Declines genetic testing  - Level II anatomy US w/ normal anatomy and echo  - Growth US: last 21 EFW 2502g 50%, AC 45%  -  testing: BPPs weekly    History of LGA infant, largest baby  9# 10 oz   - see growth US above      INTRAPARTUM  - admission orders in place  - thorough discussion regarding non reassuring  test (BPP ) for which I am recommending proceeding with IOL. Current FHT Cat I, ok to proceed with  attempt for TOLAC. Risks and benefits from TOLAC VS RCD briefly discussed. Pt has TOLAC informed consent signed.   - s/p AROM for mod meconium stained fluid  - start pitocin for IOL protocol  - T Cat I  - GBS neg  - anticipate     Pooja Lynch MD  Dept of OB/GYN  September 10, 2021

## 2021-09-11 LAB — T PALLIDUM AB SER QL: NONREACTIVE

## 2021-09-11 PROCEDURE — 250N000013 HC RX MED GY IP 250 OP 250 PS 637: Performed by: OBSTETRICS & GYNECOLOGY

## 2021-09-11 PROCEDURE — 120N000001 HC R&B MED SURG/OB

## 2021-09-11 RX ADMIN — IBUPROFEN 800 MG: 800 TABLET, FILM COATED ORAL at 22:45

## 2021-09-11 RX ADMIN — IBUPROFEN 800 MG: 800 TABLET, FILM COATED ORAL at 07:56

## 2021-09-11 RX ADMIN — DOCUSATE SODIUM 100 MG: 100 CAPSULE, LIQUID FILLED ORAL at 07:56

## 2021-09-11 RX ADMIN — IBUPROFEN 600 MG: 600 TABLET, FILM COATED ORAL at 17:04

## 2021-09-11 RX ADMIN — IBUPROFEN 800 MG: 800 TABLET, FILM COATED ORAL at 00:33

## 2021-09-11 NOTE — PLAN OF CARE
Data: Hiram Page transferred to 450 via wheelchair at 2350. Baby transferred via parent's arms.  Action: Receiving unit notified of transfer: Yes. Patient and family notified of room change. Report given to ANAND Carney at 2355. Belongings sent to receiving unit. Accompanied by Registered Nurse. Oriented patient to surroundings. Call light within reach. ID bands double-checked with receiving RN.  Response: Patient tolerated transfer and is stable.    Patients mobililty level scored using the bedside mobility assistance tool (BMAT). Patient is at a mobility level test number: 4. Mobility equipment used: none required. Required assist of 0 staff members. Further use of BMAT scoring not required.

## 2021-09-11 NOTE — PLAN OF CARE
Patient vital signs stable and meeting expected outcomes.  Breastfeeding and bottle feeding per pt request.  Up independently and voiding adequately.  Pain well controlled with ibuprofen.  Able to perform all cares for self and infant.  Bonding well with baby.  Will continue to monitor.

## 2021-09-11 NOTE — DISCHARGE SUMMARY
[facility] Discharge Summary    Hiram Page MRN# [id number]   Age: 40 year old YOB: 1980     Date of Admission:  9/10/2021  Date of Discharge::  2021  Admitting Physician:  Pooja Lynch MD  Discharge Physician:  Dr. Aiden Lynch  [phone]           Admission Diagnoses:   -IUP at 40w6d            Discharge Diagnosis:     -IUP at 40w6d, now delivered          Procedures:     Procedure(s): -            Medications Prior to Admission:     Medications Prior to Admission   Medication Sig Dispense Refill Last Dose     Prenatal Vit-Fe Fumarate-FA (PRENATAL MULTIVITAMIN  PLUS IRON) 27-0.8 MG TABS Take 1 tablet by mouth daily   2021 at Unknown time             Discharge Medications:     Current Discharge Medication List      CONTINUE these medications which have NOT CHANGED    Details   Prenatal Vit-Fe Fumarate-FA (PRENATAL MULTIVITAMIN  PLUS IRON) 27-0.8 MG TABS Take 1 tablet by mouth daily                  Brief Admission History   Ms. Page  is here due to non reassuring  testing with a BPP score of 4/8.     She was called today due to BPP score and recommended to direct herself immediately to L&D. Pt was skeptical per notes. Please see care everywhere for further details.      Upon encounter pt states she is amenable for induction based on discussion regarding poor BPP score of 4/8 and the high risk for fetal stress/death. Denies any leaking of fluid, abnormal vaginal discharge. States +FM. She is requesting TOLAC since she has hx of 3 prior successful 's.         No LMP recorded. Patient is pregnant.   Her Estimated Date of Delivery: Sep 4, 2021, making her 40w6d.     Assessment/Plan: Hiram Page is a 40 year old  at 40w6d GA  here with non reassuring  test (BPP 4/8).      INTRAPARTUM  Prenatal Care:  -OB labs reviewed: O, Rubella immune, HIV neg, Heb B nonreactive, Hep C negative, RPR negative  -Genetics: declined  -Ultrasound: L2 US  with normal anatomy and echo  - Rh positive, Rhogam not indicated  - 3rd tri labs: , Hgb 10.8, plt 144, RPR neg  - Declined Tdap, Declines Covid vaccination   - GBS negative  - Feeding: breast and formula   - Contraception: POPs     Thrombocytopenia  - plts of 121 noted in 2021, platelets were also low in last pregnancy  - last platelet count 122 ()    Insulin resistance  - Hgb A1c of 5.8 at NOB visit  - passed 1 hr GCT x2    AMA  - Declines genetic testing  - Level II anatomy US w/ normal anatomy and echo  - Growth US: last 21 EFW 2502g 50%, AC 45%  -  testing: BPPs weekly    History of LGA infant, largest baby  9# 10 oz   - see growth US above       INTRAPARTUM  - admission orders in place  - thorough discussion regarding non reassuring  test (BPP 4/8) for which I am recommending proceeding with IOL. Current T Cat I, ok to proceed with attempt for TOLAC. Risks and benefits from TOLAC VS RCD briefly discussed. Pt has TOLAC informed consent signed.   - s/p AROM for mod meconium stained fluid  - start pitocin for IOL protocol  - T Cat I  - GBS neg  - anticipate          Brief Intrapartum Course:   Delivery Date: 09/10/2021     Hiram is a 40-year-old  6, now para 6 with IUP at 40w 6d who was admitted to L/D on the day of delivery for induction due to a BPP 4/8.  She otherwise had an uncomplicated pregnancy. She was undergoing antepartum testing due to advanced maternal age and post dates.      Her history was significant for undergoing a  section during her first pregnancy with 3 subsequent successful VBACs. She strongly desired another TOLAC. Risks and benefits were reviewed. She signed the TOLAC consent.      Upon admission, she was noted to be 4 cm dilated.  Artificial rupture of membranes was performed.  Light meconium was noted.  She was started on Pitocin.  She dilated to 5 cm within 2 hours.  Within another hour she was 9 cm, and quickly after  that she was completely dilated.       She pushed for approximately 20 minutes, delivering over an intact perineum.  The fetal hand was against the fetal face upon delivery.  The anterior shoulder delivered easily, followed by the rest of the body.  The head delivered in the CHERRIE position.  The baby was a male infant, who was immediately placed on the patient's abdomen.   Apgars were 8 and 9 .  The weight is pending at this time, Delayed cord clamping was observed for 1 minute.  The cord was doubly clamped and then ligated by the father of the baby.  The patient was given IV Pitocin.  Cord bloods were obtained.    Placenta delivered within 7 minutes.  It was noted to be intact with 3 vessels.  Uterus became firm with manual massage.  QBL is pending at this time.  Inspection of the perineum revealed a superficial laceration at 6 o'clock at the entrance of the vagina.  There was no bleeding; therefore, it was not sutured.  Manual inspection along the previous  section scar was noted to be intact.      Due to history of meconium the NICU team was present for the delivery.  No intervention was needed.      Irma Herrera MD           Hospital Course:   The patient's hospital course was unremarkable.  On discharge, her pain was well controlled. Vaginal bleeding is similar to peak menstrual flow.  Voiding without difficulty.  Ambulating well and tolerating a normal diet.  No fever.  Breastfeeding well.  Infant is stable. She was discharged on post-partum day #2.    Post-partum hemoglobin:   Hemoglobin   Date Value Ref Range Status   09/10/2021 11.6 (L) 11.7 - 15.7 g/dL Final   2018 11.6 (L) 11.7 - 15.7 g/dL Final             Discharge Instructions and Follow-Up:     Discharge diet: Regular   Discharge activity: Pelvic rest for 6 weeks including no sexual intercourse, tampons, or douching.   Discharge follow-up: Follow up with your primary OB for a routine postpartum visit in 6 weeks           Discharge  Disposition:     Discharged to home      Dr. Aiden Lynch  [phone]

## 2021-09-11 NOTE — PROVIDER NOTIFICATION
09/10/21 1927   Provider Notification   Provider Name/Title Dr. Lynch   Method of Notification Phone   Notification Reason Status Update     OB updated  very uncomfortable with contractions and is not medicated. Contractions 2-3 minutes apart. OB planning to leave hospital for short period. Will update as needed.

## 2021-09-11 NOTE — PROGRESS NOTES
In house MD called for standby and delivery     male infant    placenta intact  Small superficial laceration at 6:00 not bleeding therefore no stitching required  qBL pending

## 2021-09-11 NOTE — PROGRESS NOTES
Patient Name:  Hiram Page   MRN:  [id number]  Age:  40 year old    YOB: 1980      POSTPARTUM PROGRESS NOTE    Pt is PPD#1 s/p vaginal delivery.  She is doing well without complaints.  Pt is ambulating, voiding, tolerating a regular diet.  Pain is well controlled and lochia is within normal limits.  She is breastfeeding.  Baby is doing well.    Objective:    Temp:  [97.8  F (36.6  C)-99.2  F (37.3  C)] 97.8  F (36.6  C)  Pulse:  [67-70] 70  Resp:  [15-16] 15  BP: ()/(35-67) 106/35  0 lbs 0 oz    General Appearance:  NAD  Lungs:  unlabored  Cardiovascular:  RRR  Abdomen:  nontender, nondistended  Fundus:  firm, below the umbilicus      Lower extremities:  trace symmetric edema    Lab Review:    ABO/RH(D)   Date Value Ref Range Status   09/10/2021 O POS  Final     Hemoglobin   Date Value Ref Range Status   09/10/2021 11.6 (L) 11.7 - 15.7 g/dL Final   2018 11.6 (L) 11.7 - 15.7 g/dL Final   2016 11.2 (L) 11.7 - 15.7 g/dL Final     Hematocrit   Date Value Ref Range Status   2016 34.9 (L) 35.0 - 47.0 % Final       Lab Results   Component Value Date    WBC 7.1 2016     Lab Results   Component Value Date    RBC 3.78 2016     Lab Results   Component Value Date    HGB 11.6 09/10/2021    HGB 11.6 2018     Lab Results   Component Value Date    HCT 34.9 2016     No components found for: MCT  Lab Results   Component Value Date    MCV 92 2016     Lab Results   Component Value Date    MCH 29.6 2016     Lab Results   Component Value Date    MCHC 32.1 2016     Lab Results   Component Value Date    RDW 14.3 2016     Lab Results   Component Value Date    PLT 80 2016       Assessment: 41yo  PPD#1 s/p vaginal delivery ( #4), doing well.    Plan:   - Postpartum: recovering well. Pain well controlled. Cont PO pain meds and regular diet. Encourage ambulation.  - Thrombocytopenia:  VB minimal.  Will repeat CBC at PP visit  -  Contraception: POPs; declines starting this now  - Dispo: anticipate DC PPD#2      Meredith Chan, MD [facility]  September 11, 2021

## 2021-09-11 NOTE — L&D DELIVERY NOTE
Delivery Date: 09/10/2021    Hiram is a 40-year-old  6, now para 6 with IUP at 40w 6d who was admitted to L/D on the day of delivery for induction due to a BPP .  She otherwise had an uncomplicated pregnancy. She was undergoing antepartum testing due to advanced maternal age and post dates.     Her history was significant for undergoing a  section during her first pregnancy with 3 subsequent successful VBACs. She strongly desired another TOLAC. Risks and benefits were reviewed. She signed the TOLAC consent.     Upon admission, she was noted to be 4 cm dilated.  Artificial rupture of membranes was performed.  Light meconium was noted.  She was started on Pitocin.  She dilated to 5 cm within 2 hours.  Within another hour she was 9 cm, and quickly after that she was completely dilated.      She pushed for approximately 20 minutes, delivering over an intact perineum.  The fetal hand was against the fetal face upon delivery.  The anterior shoulder delivered easily, followed by the rest of the body.  The head delivered in the CHERRIE position.  The baby was a male infant, who was immediately placed on the patient's abdomen.   Apgars were 8 and 9 .  The weight is pending at this time, Delayed cord clamping was observed for 1 minute.  The cord was doubly clamped and then ligated by the father of the baby.  The patient was given IV Pitocin.  Cord bloods were obtained.    Placenta delivered within 7 minutes.  It was noted to be intact with 3 vessels.  Uterus became firm with manual massage.  QBL is pending at this time.  Inspection of the perineum revealed a superficial laceration at 6 o'clock at the entrance of the vagina.  There was no bleeding; therefore, it was not sutured.  Manual inspection along the previous  section scar was noted to be intact.     Due to history of meconium the NICU team was present for the delivery.  No intervention was needed.     Irma Herrera MD        D: 09/10/2021   T:  09/10/2021   MT: SALONI    Name:     DERRICK MIGUEL  MRN:      [id number]        Account:       [id number]   :      1980           Delivery Date: 09/10/2021     Document: [id number]    cc:  MD Pooja Varghese MD

## 2021-09-11 NOTE — PLAN OF CARE
Data: Vital signs within normal limits. Postpartum checks within normal limits - see flow record. Patient eating and drinking normally. Patient able to empty bladder independently and is up ambulating. No apparent signs of infection. Patient performing self cares and is able to care for infant.  Action: Patient medicated during the shift for pain and cramping. See MAR. Patient reassessed within 1 hour after each medication and pain was improved - patient stated she was comfortable. Patient education done. See flow record.  Response: Positive attachment behaviors observed with infant. Support person is not present.   Plan: Anticipate discharge on 9/12/2021.

## 2021-09-12 VITALS
RESPIRATION RATE: 14 BRPM | SYSTOLIC BLOOD PRESSURE: 113 MMHG | DIASTOLIC BLOOD PRESSURE: 50 MMHG | HEART RATE: 70 BPM | TEMPERATURE: 98 F

## 2021-09-12 PROCEDURE — 250N000013 HC RX MED GY IP 250 OP 250 PS 637: Performed by: OBSTETRICS & GYNECOLOGY

## 2021-09-12 RX ORDER — IBUPROFEN 800 MG/1
800 TABLET, FILM COATED ORAL EVERY 6 HOURS PRN
Qty: 30 TABLET | Refills: 0 | Status: SHIPPED | OUTPATIENT
Start: 2021-09-12

## 2021-09-12 RX ORDER — ACETAMINOPHEN 325 MG/1
650 TABLET ORAL EVERY 6 HOURS PRN
Qty: 60 TABLET | Refills: 0 | Status: SHIPPED | OUTPATIENT
Start: 2021-09-12

## 2021-09-12 RX ORDER — DOCUSATE SODIUM 100 MG/1
100 CAPSULE, LIQUID FILLED ORAL 2 TIMES DAILY PRN
Qty: 60 CAPSULE | Refills: 0 | Status: SHIPPED | OUTPATIENT
Start: 2021-09-12

## 2021-09-12 RX ADMIN — DOCUSATE SODIUM 100 MG: 100 CAPSULE, LIQUID FILLED ORAL at 08:49

## 2021-09-12 RX ADMIN — IBUPROFEN 800 MG: 800 TABLET, FILM COATED ORAL at 08:48

## 2021-09-12 NOTE — LACTATION NOTE
Lactation in to see patient. Patient has nursed her other children without difficulty. Parents choosing to supplement with formula after feed. Encouraged patient to nurse both sides before supplementing. Observed a latch. Good latch and nursing seen. Reviewed importance of feeding frequently.

## 2021-09-12 NOTE — PLAN OF CARE
Patient stable and ready for discharge. AVS reviewed and all questions answered. Breast pump given and education provided. Medications for home: acetaminophen, ibuprofen, and colace given for home.  present and supportive. Discharged home with  and .

## 2021-09-12 NOTE — PLAN OF CARE
Pt meeting expected outcomes. Vitals stable. Fundus firm and midline. Denies difficulty voiding.  Pain controlled with ibuprofen. Independent with self and baby cares. Breast and formula feeding per preference. Anticipated discharge home tomorrow.

## 2021-09-12 NOTE — PLAN OF CARE
Patient vital signs stable and meeting expected outcomes.  Breastfeeding and bottle feeding.  Up independently and voiding adequately.  Denies need for pain meds.  Able to perform all cares for self and infant.  Bonding well with baby.  FOB present and supportive at bedside.  Will continue to monitor.

## 2024-08-29 LAB
HEPATITIS B SURFACE ANTIGEN (EXTERNAL): NEGATIVE
HEPATITIS C ANTIBODY (EXTERNAL): NEGATIVE
HIV1+2 AB SERPL QL IA: NEGATIVE
RUBELLA ANTIBODY IGG (EXTERNAL): NORMAL
TREPONEMA PALLIDUM ANTIBODY (EXTERNAL): NEGATIVE

## 2025-01-30 LAB — GROUP B STREPTOCOCCUS (EXTERNAL): NEGATIVE

## 2025-02-12 ENCOUNTER — HOSPITAL ENCOUNTER (INPATIENT)
Facility: CLINIC | Age: 45
End: 2025-02-12
Attending: OBSTETRICS & GYNECOLOGY | Admitting: OBSTETRICS & GYNECOLOGY
Payer: COMMERCIAL

## 2025-02-12 DIAGNOSIS — O34.219 VBAC, DELIVERED: ICD-10-CM

## 2025-02-12 LAB
ABO + RH BLD: NORMAL
BASOPHILS # BLD AUTO: 0 10E3/UL (ref 0–0.2)
BASOPHILS NFR BLD AUTO: 0 %
BLD GP AB SCN SERPL QL: NEGATIVE
EOSINOPHIL # BLD AUTO: 0.1 10E3/UL (ref 0–0.7)
EOSINOPHIL NFR BLD AUTO: 2 %
ERYTHROCYTE [DISTWIDTH] IN BLOOD BY AUTOMATED COUNT: 22.1 % (ref 10–15)
HCT VFR BLD AUTO: 33.3 % (ref 35–47)
HGB BLD-MCNC: 10.7 G/DL (ref 11.7–15.7)
IMM GRANULOCYTES # BLD: 0 10E3/UL
IMM GRANULOCYTES NFR BLD: 1 %
LYMPHOCYTES # BLD AUTO: 1.3 10E3/UL (ref 0.8–5.3)
LYMPHOCYTES NFR BLD AUTO: 22 %
MCH RBC QN AUTO: 26.5 PG (ref 26.5–33)
MCHC RBC AUTO-ENTMCNC: 32.1 G/DL (ref 31.5–36.5)
MCV RBC AUTO: 82 FL (ref 78–100)
MONOCYTES # BLD AUTO: 0.4 10E3/UL (ref 0–1.3)
MONOCYTES NFR BLD AUTO: 6 %
NEUTROPHILS # BLD AUTO: 4.2 10E3/UL (ref 1.6–8.3)
NEUTROPHILS NFR BLD AUTO: 70 %
NRBC # BLD AUTO: 0 10E3/UL
NRBC BLD AUTO-RTO: 0 /100
PLATELET # BLD AUTO: 117 10E3/UL (ref 150–450)
RBC # BLD AUTO: 4.04 10E6/UL (ref 3.8–5.2)
SPECIMEN EXP DATE BLD: NORMAL
T PALLIDUM AB SER QL: NONREACTIVE
WBC # BLD AUTO: 6 10E3/UL (ref 4–11)

## 2025-02-12 PROCEDURE — 86780 TREPONEMA PALLIDUM: CPT | Performed by: OBSTETRICS & GYNECOLOGY

## 2025-02-12 PROCEDURE — 3E033VJ INTRODUCTION OF OTHER HORMONE INTO PERIPHERAL VEIN, PERCUTANEOUS APPROACH: ICD-10-PCS | Performed by: OBSTETRICS & GYNECOLOGY

## 2025-02-12 PROCEDURE — 250N000009 HC RX 250: Performed by: OBSTETRICS & GYNECOLOGY

## 2025-02-12 PROCEDURE — 10907ZC DRAINAGE OF AMNIOTIC FLUID, THERAPEUTIC FROM PRODUCTS OF CONCEPTION, VIA NATURAL OR ARTIFICIAL OPENING: ICD-10-PCS | Performed by: OBSTETRICS & GYNECOLOGY

## 2025-02-12 PROCEDURE — 120N000001 HC R&B MED SURG/OB

## 2025-02-12 PROCEDURE — 258N000003 HC RX IP 258 OP 636: Performed by: OBSTETRICS & GYNECOLOGY

## 2025-02-12 PROCEDURE — 85025 COMPLETE CBC W/AUTO DIFF WBC: CPT | Performed by: OBSTETRICS & GYNECOLOGY

## 2025-02-12 PROCEDURE — 722N000001 HC LABOR CARE VAGINAL DELIVERY SINGLE

## 2025-02-12 PROCEDURE — 86901 BLOOD TYPING SEROLOGIC RH(D): CPT | Performed by: OBSTETRICS & GYNECOLOGY

## 2025-02-12 RX ORDER — LIDOCAINE 40 MG/G
CREAM TOPICAL
Status: DISCONTINUED | OUTPATIENT
Start: 2025-02-12 | End: 2025-02-12

## 2025-02-12 RX ORDER — METHYLERGONOVINE MALEATE 0.2 MG/ML
200 INJECTION INTRAVENOUS
Status: DISCONTINUED | OUTPATIENT
Start: 2025-02-12 | End: 2025-02-12

## 2025-02-12 RX ORDER — KETOROLAC TROMETHAMINE 15 MG/ML
15 INJECTION, SOLUTION INTRAMUSCULAR; INTRAVENOUS
Status: DISCONTINUED | OUTPATIENT
Start: 2025-02-12 | End: 2025-02-12

## 2025-02-12 RX ORDER — TRANEXAMIC ACID 10 MG/ML
1 INJECTION, SOLUTION INTRAVENOUS EVERY 30 MIN PRN
Status: DISCONTINUED | OUTPATIENT
Start: 2025-02-12 | End: 2025-02-12

## 2025-02-12 RX ORDER — IBUPROFEN 800 MG/1
800 TABLET, FILM COATED ORAL EVERY 6 HOURS PRN
Status: DISCONTINUED | OUTPATIENT
Start: 2025-02-12 | End: 2025-02-14 | Stop reason: HOSPADM

## 2025-02-12 RX ORDER — METOCLOPRAMIDE HYDROCHLORIDE 5 MG/ML
10 INJECTION INTRAMUSCULAR; INTRAVENOUS EVERY 6 HOURS PRN
Status: DISCONTINUED | OUTPATIENT
Start: 2025-02-12 | End: 2025-02-12

## 2025-02-12 RX ORDER — LOPERAMIDE HYDROCHLORIDE 2 MG/1
4 CAPSULE ORAL
Status: DISCONTINUED | OUTPATIENT
Start: 2025-02-12 | End: 2025-02-12

## 2025-02-12 RX ORDER — MISOPROSTOL 200 UG/1
400 TABLET ORAL
Status: DISCONTINUED | OUTPATIENT
Start: 2025-02-12 | End: 2025-02-12

## 2025-02-12 RX ORDER — POLYETHYLENE GLYCOL 3350 17 G/17G
17 POWDER, FOR SOLUTION ORAL DAILY PRN
Status: DISCONTINUED | OUTPATIENT
Start: 2025-02-12 | End: 2025-02-14 | Stop reason: HOSPADM

## 2025-02-12 RX ORDER — ACETAMINOPHEN 325 MG/1
650 TABLET ORAL EVERY 4 HOURS PRN
Qty: 60 TABLET | Refills: 1 | Status: SHIPPED | OUTPATIENT
Start: 2025-02-12

## 2025-02-12 RX ORDER — ONDANSETRON 2 MG/ML
4 INJECTION INTRAMUSCULAR; INTRAVENOUS EVERY 6 HOURS PRN
Status: DISCONTINUED | OUTPATIENT
Start: 2025-02-12 | End: 2025-02-12

## 2025-02-12 RX ORDER — OXYCODONE HYDROCHLORIDE 5 MG/1
5 TABLET ORAL EVERY 4 HOURS PRN
Status: DISCONTINUED | OUTPATIENT
Start: 2025-02-12 | End: 2025-02-14 | Stop reason: HOSPADM

## 2025-02-12 RX ORDER — OXYTOCIN 10 [USP'U]/ML
10 INJECTION, SOLUTION INTRAMUSCULAR; INTRAVENOUS
Status: DISCONTINUED | OUTPATIENT
Start: 2025-02-12 | End: 2025-02-12

## 2025-02-12 RX ORDER — OXYTOCIN/0.9 % SODIUM CHLORIDE 30/500 ML
1-24 PLASTIC BAG, INJECTION (ML) INTRAVENOUS CONTINUOUS
Status: DISCONTINUED | OUTPATIENT
Start: 2025-02-12 | End: 2025-02-12

## 2025-02-12 RX ORDER — ACETAMINOPHEN 325 MG/1
650 TABLET ORAL EVERY 4 HOURS PRN
Status: DISCONTINUED | OUTPATIENT
Start: 2025-02-12 | End: 2025-02-14 | Stop reason: HOSPADM

## 2025-02-12 RX ORDER — MISOPROSTOL 200 UG/1
800 TABLET ORAL
Status: DISCONTINUED | OUTPATIENT
Start: 2025-02-12 | End: 2025-02-14 | Stop reason: HOSPADM

## 2025-02-12 RX ORDER — AMOXICILLIN 250 MG
1 CAPSULE ORAL 2 TIMES DAILY PRN
Qty: 30 TABLET | Refills: 1 | Status: SHIPPED | OUTPATIENT
Start: 2025-02-12

## 2025-02-12 RX ORDER — IBUPROFEN 800 MG/1
800 TABLET, FILM COATED ORAL EVERY 6 HOURS PRN
Qty: 30 TABLET | Refills: 1 | Status: SHIPPED | OUTPATIENT
Start: 2025-02-12

## 2025-02-12 RX ORDER — IBUPROFEN 800 MG/1
800 TABLET, FILM COATED ORAL
Status: DISCONTINUED | OUTPATIENT
Start: 2025-02-12 | End: 2025-02-12

## 2025-02-12 RX ORDER — OXYTOCIN/0.9 % SODIUM CHLORIDE 30/500 ML
100-340 PLASTIC BAG, INJECTION (ML) INTRAVENOUS CONTINUOUS PRN
Status: DISCONTINUED | OUTPATIENT
Start: 2025-02-12 | End: 2025-02-12

## 2025-02-12 RX ORDER — OXYTOCIN 10 [USP'U]/ML
10 INJECTION, SOLUTION INTRAMUSCULAR; INTRAVENOUS
Status: DISCONTINUED | OUTPATIENT
Start: 2025-02-12 | End: 2025-02-14 | Stop reason: HOSPADM

## 2025-02-12 RX ORDER — NALOXONE HYDROCHLORIDE 0.4 MG/ML
0.2 INJECTION, SOLUTION INTRAMUSCULAR; INTRAVENOUS; SUBCUTANEOUS
Status: DISCONTINUED | OUTPATIENT
Start: 2025-02-12 | End: 2025-02-14 | Stop reason: HOSPADM

## 2025-02-12 RX ORDER — OXYTOCIN/0.9 % SODIUM CHLORIDE 30/500 ML
340 PLASTIC BAG, INJECTION (ML) INTRAVENOUS CONTINUOUS PRN
Status: DISCONTINUED | OUTPATIENT
Start: 2025-02-12 | End: 2025-02-14 | Stop reason: HOSPADM

## 2025-02-12 RX ORDER — CARBOPROST TROMETHAMINE 250 UG/ML
250 INJECTION, SOLUTION INTRAMUSCULAR
Status: DISCONTINUED | OUTPATIENT
Start: 2025-02-12 | End: 2025-02-14 | Stop reason: HOSPADM

## 2025-02-12 RX ORDER — LOPERAMIDE HYDROCHLORIDE 2 MG/1
4 CAPSULE ORAL
Status: DISCONTINUED | OUTPATIENT
Start: 2025-02-12 | End: 2025-02-14 | Stop reason: HOSPADM

## 2025-02-12 RX ORDER — METOCLOPRAMIDE 10 MG/1
10 TABLET ORAL EVERY 6 HOURS PRN
Status: DISCONTINUED | OUTPATIENT
Start: 2025-02-12 | End: 2025-02-12

## 2025-02-12 RX ORDER — CARBOPROST TROMETHAMINE 250 UG/ML
250 INJECTION, SOLUTION INTRAMUSCULAR
Status: DISCONTINUED | OUTPATIENT
Start: 2025-02-12 | End: 2025-02-12

## 2025-02-12 RX ORDER — PROCHLORPERAZINE MALEATE 10 MG
10 TABLET ORAL EVERY 6 HOURS PRN
Status: DISCONTINUED | OUTPATIENT
Start: 2025-02-12 | End: 2025-02-12

## 2025-02-12 RX ORDER — NALOXONE HYDROCHLORIDE 0.4 MG/ML
0.4 INJECTION, SOLUTION INTRAMUSCULAR; INTRAVENOUS; SUBCUTANEOUS
Status: DISCONTINUED | OUTPATIENT
Start: 2025-02-12 | End: 2025-02-14 | Stop reason: HOSPADM

## 2025-02-12 RX ORDER — TRANEXAMIC ACID 10 MG/ML
1 INJECTION, SOLUTION INTRAVENOUS EVERY 30 MIN PRN
Status: DISCONTINUED | OUTPATIENT
Start: 2025-02-12 | End: 2025-02-14 | Stop reason: HOSPADM

## 2025-02-12 RX ORDER — METHYLERGONOVINE MALEATE 0.2 MG/ML
200 INJECTION INTRAVENOUS
Status: DISCONTINUED | OUTPATIENT
Start: 2025-02-12 | End: 2025-02-14 | Stop reason: HOSPADM

## 2025-02-12 RX ORDER — LOPERAMIDE HYDROCHLORIDE 2 MG/1
2 CAPSULE ORAL
Status: DISCONTINUED | OUTPATIENT
Start: 2025-02-12 | End: 2025-02-12

## 2025-02-12 RX ORDER — HYDROCORTISONE 25 MG/G
CREAM TOPICAL 3 TIMES DAILY PRN
Status: DISCONTINUED | OUTPATIENT
Start: 2025-02-12 | End: 2025-02-14 | Stop reason: HOSPADM

## 2025-02-12 RX ORDER — MISOPROSTOL 200 UG/1
800 TABLET ORAL
Status: DISCONTINUED | OUTPATIENT
Start: 2025-02-12 | End: 2025-02-12

## 2025-02-12 RX ORDER — OXYTOCIN/0.9 % SODIUM CHLORIDE 30/500 ML
340 PLASTIC BAG, INJECTION (ML) INTRAVENOUS CONTINUOUS PRN
Status: DISCONTINUED | OUTPATIENT
Start: 2025-02-12 | End: 2025-02-12

## 2025-02-12 RX ORDER — AMOXICILLIN 250 MG
2 CAPSULE ORAL
Status: DISCONTINUED | OUTPATIENT
Start: 2025-02-12 | End: 2025-02-14 | Stop reason: HOSPADM

## 2025-02-12 RX ORDER — MISOPROSTOL 200 UG/1
400 TABLET ORAL
Status: DISCONTINUED | OUTPATIENT
Start: 2025-02-12 | End: 2025-02-14 | Stop reason: HOSPADM

## 2025-02-12 RX ORDER — CITRIC ACID/SODIUM CITRATE 334-500MG
30 SOLUTION, ORAL ORAL
Status: DISCONTINUED | OUTPATIENT
Start: 2025-02-12 | End: 2025-02-12

## 2025-02-12 RX ORDER — PRENATAL VIT/IRON FUM/FOLIC AC 27MG-0.8MG
1 TABLET ORAL DAILY
Status: DISCONTINUED | OUTPATIENT
Start: 2025-02-13 | End: 2025-02-14 | Stop reason: HOSPADM

## 2025-02-12 RX ORDER — LOPERAMIDE HYDROCHLORIDE 2 MG/1
2 CAPSULE ORAL
Status: DISCONTINUED | OUTPATIENT
Start: 2025-02-12 | End: 2025-02-14 | Stop reason: HOSPADM

## 2025-02-12 RX ORDER — ONDANSETRON 4 MG/1
4 TABLET, ORALLY DISINTEGRATING ORAL EVERY 6 HOURS PRN
Status: DISCONTINUED | OUTPATIENT
Start: 2025-02-12 | End: 2025-02-12

## 2025-02-12 RX ORDER — SODIUM CHLORIDE, SODIUM LACTATE, POTASSIUM CHLORIDE, CALCIUM CHLORIDE 600; 310; 30; 20 MG/100ML; MG/100ML; MG/100ML; MG/100ML
INJECTION, SOLUTION INTRAVENOUS CONTINUOUS PRN
Status: DISCONTINUED | OUTPATIENT
Start: 2025-02-12 | End: 2025-02-12

## 2025-02-12 RX ORDER — BISACODYL 10 MG
10 SUPPOSITORY, RECTAL RECTAL DAILY PRN
Status: DISCONTINUED | OUTPATIENT
Start: 2025-02-12 | End: 2025-02-14 | Stop reason: HOSPADM

## 2025-02-12 RX ADMIN — SODIUM CHLORIDE, POTASSIUM CHLORIDE, SODIUM LACTATE AND CALCIUM CHLORIDE: 600; 310; 30; 20 INJECTION, SOLUTION INTRAVENOUS at 09:20

## 2025-02-12 RX ADMIN — Medication 2 MILLI-UNITS/MIN: at 09:20

## 2025-02-12 ASSESSMENT — ACTIVITIES OF DAILY LIVING (ADL)
ADLS_ACUITY_SCORE: 20
ADLS_ACUITY_SCORE: 46
ADLS_ACUITY_SCORE: 20

## 2025-02-12 NOTE — PROVIDER NOTIFICATION
02/12/25 1500   Provider Notification   Provider Name/Title Dr. Preston   Method of Notification At Bedside   Request Evaluate in Person     MD at bedside for cervical check and AROM.

## 2025-02-12 NOTE — H&P
[facility]     History and Physical  Obstetrics and Gynecology     Date of Admission:  2025    History of Present Illness   Hiram Page is a 44 year old female  40w0d with Estimated Date of Delivery: 2025 who presents for induction for AMA       PRENATAL COURSE  Prenatal care was received at [facility] and the  course was complicated by AMA, JOE, hx  X1, hx  X4, gestational thrombocytopenia.      Recent Labs   Lab Test 25  0819 09/10/21  1838 18  1400   ABO  --   --  O   RH  --   --  Pos   AS Negative   < > Neg    < > = values in this interval not displayed.     Rhogam not indicated   Recent Labs   Lab Test 21  1200 18  0000 18  0000   HEPBANG  --   --  Non Reactive   HIAGAB  --   --  Negative   GBS Negative   < >  --     < > = values in this interval not displayed.       Past Medical History    I have reviewed this patient's medical history and updated it with pertinent information if needed.   Past Medical History:   Diagnosis Date    Vitamin D deficiency        Past Surgical History   I have reviewed this patient's surgical history and updated it with pertinent information if needed.  Past Surgical History:   Procedure Laterality Date     SECTION         Prior to Admission Medications   Prior to Admission Medications   Prescriptions Last Dose Informant Patient Reported? Taking?   Prenatal Vit-Fe Fumarate-FA (PRENATAL MULTIVITAMIN  PLUS IRON) 27-0.8 MG TABS 2025  Yes Yes   Sig: Take 1 tablet by mouth daily   acetaminophen (TYLENOL) 325 MG tablet Unknown  No No   Sig: Take 2 tablets (650 mg) by mouth every 6 hours as needed for mild pain or fever   docusate sodium (COLACE) 100 MG capsule Past Week  No Yes   Sig: Take 1 capsule (100 mg) by mouth 2 times daily as needed for constipation   ibuprofen (ADVIL/MOTRIN) 800 MG tablet Unknown  No No   Sig: Take 1 tablet (800 mg)  by mouth every 6 hours as needed for other (cramping)      Facility-Administered Medications: None     Allergies   No Known Allergies    Social History   I have reviewed this patient's social history and updated it with pertinent information if needed. Hiram Page  reports that she has never smoked. She has never used smokeless tobacco. She reports that she does not drink alcohol and does not use drugs.    Family History   Family history reviewed with patient and is noncontributory.    Immunization History   - Declined flu, COVID, TDAP     Physical Exam   Temp: 97.7  F (36.5  C) Temp src: Oral BP: 109/56     Resp: 16        Vital Signs with Ranges  Temp:  [97.7  F (36.5  C)] 97.7  F (36.5  C)  Resp:  [16] 16  BP: (109)/(56) 109/56  Fetal Heart Tones: 130 baseline, moderate variablility, + accels, no decels, and Category I  TOCO: rare    Constitutional: healthy, alert, and active   Respiratory: No increased work of breathing, good air exchange, clear to auscultation bilaterally, no crackles or wheezing  Cardiovascular: Normal apical impulse, regular rate and rhythm, normal S1 and S2, no S3 or S4, and no murmur noted  Abdomen: gravid, single vertex fetus, non-tender, EFW 7 lbs 0  Cervical Exam: 2.5/ 60/ Mid/ soft/ -3       Assessment & Plan   Hiram Page is a 44 year old female  who presents at 40w0d for induction.   GBS negative.      Admit - see IP orders  Labor induction with Pitocin  Pain medication as desires  Anticipate       #. Prenatal Care:   - OB labs reviewed: O positive, Rubella immune, HIV negative, Hep B neg/equivocal, Hep C negative RPR negative  - Genetics/aneuploidy: declined  - Anatomy ultrasound: L2 overall wnl  - Rh positive, Rhogam not indicated  - Elevated 1hr GTT, normal 2hr GTT  - Declined flu, COVID, TDAP  - GBS neg   - Feeding: breastfeeding, rx given for pump  - Contraception: wants to discuss at PP visit  - [facility]eds: [facility]  - Continue daily  PNV    #. Advanced Maternal Age, >=40:  - Growth ultrasound 24: EFW 19th%, AC 15th%    #. Iron Deficiency Anemia:   - hgb 10.7 on admission     #. Hx  Section, Desiring TOLAC:   -  NRFHT  - Given  x4, she is an excellent candidate for TOLAC.   - [employer] desires TOLAC. [facility] TOLAC consent form signed.     #. Gestational thrombocytopenia   -  platelets: 136  - on admission 117K     Valarie Preston, , DO

## 2025-02-12 NOTE — PROGRESS NOTES
"[facility]   OB PROGRESS NOTE     S. Pt states she is doing well overall. Sleeping with contractions  . +FM    /55   Temp 97.7  F (36.5  C) (Oral)   Resp 16   Ht 1.651 m (5' 5\")   Wt 75.8 kg (167 lb)   BMI 27.79 kg/m      Fetal Heart Tones: 140 baseline, moderate variablility, + accels, no decels, and variables intermittent   TOCO: frequency q 3 minutes  Cervical Exam: 3/ 70/ Anterior/ soft/ -2     A/P Hiram Page is a 44 year old  at 40w0d for IOL for AMA    1- IOL  - AROM with mec fluid   - pitocin at 16 units       Dr. Valarie Preston DO       "

## 2025-02-13 VITALS
DIASTOLIC BLOOD PRESSURE: 47 MMHG | SYSTOLIC BLOOD PRESSURE: 101 MMHG | HEIGHT: 65 IN | BODY MASS INDEX: 27.82 KG/M2 | RESPIRATION RATE: 16 BRPM | TEMPERATURE: 97.9 F | HEART RATE: 62 BPM | WEIGHT: 167 LBS

## 2025-02-13 LAB
ERYTHROCYTE [DISTWIDTH] IN BLOOD BY AUTOMATED COUNT: 22 % (ref 10–15)
HCT VFR BLD AUTO: 32.1 % (ref 35–47)
HGB BLD-MCNC: 10.3 G/DL (ref 11.7–15.7)
MCH RBC QN AUTO: 26.7 PG (ref 26.5–33)
MCHC RBC AUTO-ENTMCNC: 32.1 G/DL (ref 31.5–36.5)
MCV RBC AUTO: 83 FL (ref 78–100)
PLATELET # BLD AUTO: 96 10E3/UL (ref 150–450)
RBC # BLD AUTO: 3.86 10E6/UL (ref 3.8–5.2)
WBC # BLD AUTO: 6.9 10E3/UL (ref 4–11)

## 2025-02-13 PROCEDURE — 85041 AUTOMATED RBC COUNT: CPT | Performed by: OBSTETRICS & GYNECOLOGY

## 2025-02-13 PROCEDURE — 250N000013 HC RX MED GY IP 250 OP 250 PS 637: Performed by: OBSTETRICS & GYNECOLOGY

## 2025-02-13 PROCEDURE — 36415 COLL VENOUS BLD VENIPUNCTURE: CPT | Performed by: OBSTETRICS & GYNECOLOGY

## 2025-02-13 PROCEDURE — 120N000001 HC R&B MED SURG/OB

## 2025-02-13 RX ADMIN — SENNOSIDES AND DOCUSATE SODIUM 2 TABLET: 50; 8.6 TABLET ORAL at 09:32

## 2025-02-13 RX ADMIN — PSYLLIUM HUSK 1 PACKET: 3.4 POWDER ORAL at 09:33

## 2025-02-13 RX ADMIN — PRENATAL VITAMINS-IRON FUMARATE 27 MG IRON-FOLIC ACID 0.8 MG TABLET 1 TABLET: at 09:33

## 2025-02-13 ASSESSMENT — ACTIVITIES OF DAILY LIVING (ADL)
ADLS_ACUITY_SCORE: 24
ADLS_ACUITY_SCORE: 20
ADLS_ACUITY_SCORE: 24
ADLS_ACUITY_SCORE: 20
ADLS_ACUITY_SCORE: 24
ADLS_ACUITY_SCORE: 20
ADLS_ACUITY_SCORE: 24
ADLS_ACUITY_SCORE: 24

## 2025-02-13 NOTE — PROVIDER NOTIFICATION
02/12/25 1930   Provider Notification   Provider Name/Title NICU   Method of Notification At Bedside   Request Attend Delivery     NICU at bedside for delivery from [facility] with mec

## 2025-02-13 NOTE — LACTATION NOTE
Lactation in to visit with patient. Baby number 6 for this mother. States she has a history of making enough milk. Per her preference she is giving formula. Encouraged to breastfeed frequently, doing breast before offering formula. Baby cueing in bassinet at time of visit. Encouraged [employer] to feed. Stated she just ate formula and she wanted to wait to feed. Offered assistance patient wanting to wait. Needing a pump for home. Knows to call for feeds if needed, questions or concerns.

## 2025-02-13 NOTE — PLAN OF CARE
"Goal Outcome Evaluation:      Plan of Care Reviewed With: patient    Overall Patient Progress: improvingOverall Patient Progress: improving    Outcome Evaluation: vss, denies pain    Patient meeting expected goals. Is up independent in room, meeting all personal and infant needs. VSS. Is both breast and bottle feeding. Encouraged to start with BF before formula, to BF every 2-3 hours and to call out with next feeding so deeper latch may be worked on; patient agreed. Denies pain and declines offer of pain meds. Voiding without difficulty.       Problem: Adult Inpatient Plan of Care  Goal: Plan of Care Review  Description: The Plan of Care Review/Shift note should be completed every shift.  The Outcome Evaluation is a brief statement about your assessment that the patient is improving, declining, or no change.  This information will be displayed automatically on your shift  note.  Outcome: Progressing  Flowsheets (Taken 2/13/2025 1044)  Outcome Evaluation: vss, denies pain  Plan of Care Reviewed With: patient  Overall Patient Progress: improving  Goal: Patient-Specific Goal (Individualized)  Description: You can add care plan individualizations to a care plan. Examples of Individualization might be:  \"Parent requests to be called daily at 9am for status\", \"I have a hard time hearing out of my right ear\", or \"Do not touch me to wake me up as it startles  me\".  Outcome: Progressing  Goal: Absence of Hospital-Acquired Illness or Injury  Outcome: Progressing  Intervention: Prevent Skin Injury  Recent Flowsheet Documentation  Taken 2/13/2025 0934 by Jamila Soares, RN  Body Position: position changed independently  Intervention: Prevent Infection  Recent Flowsheet Documentation  Taken 2/13/2025 0930 by Jamila Soares, RN  Infection Prevention:   rest/sleep promoted   hand hygiene promoted  Goal: Optimal Comfort and Wellbeing  Outcome: Progressing  Intervention: Monitor Pain and Promote Comfort  Recent " Flowsheet Documentation  Taken 2/13/2025 0934 by Jamila Soares RN  Pain Management Interventions:   medication offered but refused   pain management plan reviewed with patient/caregiver   pillow support provided  Intervention: Provide Person-Centered Care  Recent Flowsheet Documentation  Taken 2/13/2025 0930 by Jamila Soares RN  Trust Relationship/Rapport:   care explained   choices provided   questions answered   questions encouraged   reassurance provided   thoughts/feelings acknowledged  Goal: Readiness for Transition of Care  Outcome: Progressing     Problem: Postpartum (Vaginal Delivery)  Goal: Successful Parent Role Transition  Outcome: Progressing  Intervention: Support Parent Role Transition  Recent Flowsheet Documentation  Taken 2/13/2025 0930 by Jamila Soares RN  Supportive Measures:   active listening utilized   decision-making supported   goal-setting facilitated   positive reinforcement provided   self-care encouraged   self-responsibility promoted  Parent-Child Attachment Promotion:   caring behavior modeled   interaction encouraged   strengths emphasized   positive reinforcement provided   participation in care promoted   parent/caregiver presence encouraged  Goal: Hemostasis  Outcome: Progressing  Goal: Absence of Infection Signs and Symptoms  Outcome: Progressing  Goal: Anesthesia/Sedation Recovery  Outcome: Progressing  Goal: Optimal Pain Control and Function  Outcome: Progressing  Intervention: Prevent or Manage Pain  Recent Flowsheet Documentation  Taken 2/13/2025 0934 by Jamila Soares RN  Pain Management Interventions:   medication offered but refused   pain management plan reviewed with patient/caregiver   pillow support provided  Perineal Care: absorbent brief/pad changed  Goal: Effective Urinary Elimination  Outcome: Progressing  Intervention: Monitor and Manage Urinary Retention  Recent Flowsheet Documentation  Taken 2/13/2025 0930 by  Jamila Soares, RN  Urinary Elimination Promotion:   absorbent pad/diaper use encouraged   frequent voiding encouraged

## 2025-02-13 NOTE — L&D DELIVERY NOTE
Hiram Page is a 44 year old-year-old  female,  7 para 5 with  EDC 25, who was admitted for induction for AMA at 40 weeks gestation.    Her prenatal care was at the [facility] in [place]. Prenatal course was complicated by  AMA, JOE, hx  X1, hx  X4, gestational thrombocytopenia.  . Vaginal Group B Streptococcus culture was negative.  Patient underwent artificial rupture of membranes at 1500, yielding moderate meconium fluid.  Oxytocin induction/augmentation was initiated per standard protocol for absent/inadequate labor.  The patient achieved complete dilation at 1928. She went on to deliver a 6 #, 8 oz female infant at 1933 by . Apgars were  8 at one minute and 9 at five minutes. The fetal oropharynx was bulb suctioned on the perineum.  There was no nuchal cord. The placenta delivered spontaneously and intact at 1944.  The patient had a first degree. This was not repaired due to hemostasis.  EBL for the delivery was 25cc.   Dr. Valarie Preston DO

## 2025-02-13 NOTE — PROGRESS NOTES
"Patient Name:  Hiram Page   MRN:  [id number]  Age:  44 year old    YOB: 1980      POSTPARTUM PROGRESS NOTE    Pt is PPD#1 s/p vaginal delivery.  She is doing well without complaints.  Pt is ambulating, voiding, tolerating a regular diet.  Pain is well controlled and lochia is within normal limits.  She is formula feeding.  Baby is doing well.    Objective:    Temp:  [97.1  F (36.2  C)-98.6  F (37  C)] 98.6  F (37  C)  Pulse:  [62-66] 62  Resp:  [16-18] 18  BP: ()/(42-70) 100/44  167 lbs 0 oz    General Appearance:  NAD  Abdomen:  nontender, nondistended  Fundus:  firm, below the umbilicus      Lower extremities:  no significant edema    Lab Review:  Lab Results   Component Value Date    WBC 6.9 2025    WBC 7.1 2016     Lab Results   Component Value Date    RBC 3.86 2025    RBC 3.78 2016     Lab Results   Component Value Date    HGB 10.3 2025    HGB 11.6 2018     Lab Results   Component Value Date    HCT 32.1 2025    HCT 34.9 2016     No components found for: \"MCT\"  Lab Results   Component Value Date    MCV 83 2025    MCV 92 2016     Lab Results   Component Value Date    MCH 26.7 2025    MCH 29.6 2016     Lab Results   Component Value Date    MCHC 32.1 2025    MCHC 32.1 2016     Lab Results   Component Value Date    RDW 22.0 2025    RDW 14.3 2016     Lab Results   Component Value Date    PLT 96 2025    PLT 80 2016       Assessment:  PPD#1 s/p , doing well.    Plan:   - Postpartum: recovering well. Pain well controlled. Cont PO pain meds and regular diet. Encourage ambulation.  - Chronic JOE   + Astx   + Hgb stable   - Gestational Thrombocytopenia   + Astx   + Trend daily platelet  - Contraception: undecided  - Dispo: anticipate DC PPD#2    "

## 2025-02-13 NOTE — CARE PLAN
Data: Hiram Page transferred to [facility] via wheelchair at 2145. Baby transferred via parent's arms.  Action: Receiving unit notified of transfer: Yes. Patient and family notified of room change. Report given to Nancy at 2150. Belongings sent to receiving unit. Accompanied by Registered Nurse. Oriented patient to surroundings. Call light within reach. ID bands double-checked with receiving RN.  Response: Patient tolerated transfer and is stable.    Patients mobililty level scored using the bedside mobility assistance tool (BMAT). Patient is at a mobility level test number: 4. Mobility equipment used: none required. Required assist of 0 staff members. Further use of BMAT scoring not required.

## 2025-02-13 NOTE — PLAN OF CARE
Goal Outcome Evaluation:   VSS. Up independently. Tolerating regular diet. Planning to breastfeed . Primarily formula fed  overnight. Encouraged breastfeeding as soon as possible to promote milk production. Denies pain & declines medication. Bonding well with . Friend supportive @ bedside. Denies preeclampsia symptoms. Fundus firm @ midline, lochia WDL. Voiding adequately.        Plan of Care Reviewed With: patient, friend    Overall Patient Progress: improvingOverall Patient Progress: improving           Problem: Adult Inpatient Plan of Care  Goal: Plan of Care Review  Description: The Plan of Care Review/Shift note should be completed every shift.  The Outcome Evaluation is a brief statement about your assessment that the patient is improving, declining, or no change.  This information will be displayed automatically on your shift  note.  2025 by Nancy Bateman RN  Outcome: Progressing  Flowsheets (Taken 2025)  Plan of Care Reviewed With:   patient   friend  Overall Patient Progress: improving  2025 by Nancy Bateman, RN  Outcome: Progressing

## 2025-02-13 NOTE — PROVIDER NOTIFICATION
02/12/25 1912   Provider Notification   Provider Name/Title Dr. Preston   Method of Notification At Bedside   Request Attend Delivery     MD at bedside to attend delivery

## 2025-02-13 NOTE — PLAN OF CARE
"Goal Outcome Evaluation:      Plan of Care Reviewed With: patient    Overall Patient Progress: improvingOverall Patient Progress: improving    Outcome Evaluation: Pt is progressing on care plan goals.    Vital signs stable.  Pt reports no pain but has Tylenol and Ibuprofen available if needed. Fundus firm, lochia scant. Pt is ambulating on her own in room, voiding without difficulty and independent in cares for self and baby.  Pt is breast and formula feeding.  Discussed the needs to stimulate breasts for milk production either by feeding baby or pumping.  Pump set up at bedside per pt request.  Pt reports she is doing well and would like to go home as soon as baby is cleared for discharge.   is at home with other children.  Pt is positively interacting with baby.       Problem: Adult Inpatient Plan of Care  Goal: Plan of Care Review  Description: The Plan of Care Review/Shift note should be completed every shift.  The Outcome Evaluation is a brief statement about your assessment that the patient is improving, declining, or no change.  This information will be displayed automatically on your shift  note.  Outcome: Progressing  Flowsheets (Taken 2/13/2025 8798)  Outcome Evaluation: Pt is progressing on care plan goals.  Plan of Care Reviewed With: patient  Overall Patient Progress: improving  Goal: Patient-Specific Goal (Individualized)  Description: You can add care plan individualizations to a care plan. Examples of Individualization might be:  \"Parent requests to be called daily at 9am for status\", \"I have a hard time hearing out of my right ear\", or \"Do not touch me to wake me up as it startles  me\".  Outcome: Progressing  Goal: Absence of Hospital-Acquired Illness or Injury  Outcome: Progressing  Intervention: Prevent Skin Injury  Recent Flowsheet Documentation  Taken 2/13/2025 2454 by Jackie Michael, RN  Body Position: position changed independently  Intervention: Prevent and Manage VTE (Venous " Thromboembolism) Risk  Recent Flowsheet Documentation  Taken 2/13/2025 1556 by Jackie Michael RN  VTE Prevention/Management: (ambulating in room) SCDs off (sequential compression devices)  Intervention: Prevent Infection  Recent Flowsheet Documentation  Taken 2/13/2025 1556 by Jackie Michael RN  Infection Prevention: hand hygiene promoted  Goal: Optimal Comfort and Wellbeing  Outcome: Progressing  Intervention: Provide Person-Centered Care  Recent Flowsheet Documentation  Taken 2/13/2025 1556 by Jackie Michael RN  Trust Relationship/Rapport:   care explained   choices provided   questions answered   questions encouraged  Goal: Readiness for Transition of Care  Outcome: Progressing

## 2025-02-13 NOTE — DISCHARGE SUMMARY
"[facility]    Discharge Summary  Obstetrics    Date of Admission:  2025  Date of Discharge:  {DISCHARGE DATE:170831}  Discharging Provider: ***    Discharge Diagnoses     Gestational thrombocytopenia  AMA  ***    History of Present Illness   Hiram Page is a 44 year old female now  who presented to L&D @ 40w0d for induction for AMA. Her pregnancy has been complicated by  AMA, JOE, hx  X1, hx  X4, gestational thrombocytopenia. Please see her admit H&P for full details of her PMH, PSH, Meds, Allergies and exam on admit.    Hospital Course   The patient had a Vaginal delivery after  @ 40w0d, please see her delivery summary for full details.     Her postpartum course was {COMPLICATED:9078}. On postpartum day {0-3:651497}, she was meeting all of her postpartum goals and deemed stable for discharge. She was voiding without difficulty, tolerating a regular diet without nausea and vomiting, her pain was well controlled on oral pain medicines and her lochia was appropriate.    Hgb:   Lab Results   Component Value Date    HGB 10.7 2025    HGB 11.6 09/10/2021    HGB 11.6 2018    HGB 11.2 2016       Lab Results   Component Value Date    RH Pos 2018    and rhogam was not given    Contraception was discussed and will be addressed at her postpartum appointment.    Instructions:  1) Call for temperature greater than 100.4F, foul smelling vaginal discharge, bleeding more than 1 pad per hour for 2 hrs, pain not controlled by oral pain meds, severe constipation or severe nausea or vomiting.  2) She was instructed to follow-up with her primary OB in 6 weeks for a routine postpartum visit  3) She was instructed to continue her PNV on discharge if she wished to breast feed her infant.     ***    Discharge Disposition   Discharged to home   Condition at discharge: {CONDITION:940818::\"Stable\"}    Primary Care Physician   Fiona REDDY" Young    Consultations This Hospital Stay   LACTATION IP CONSULT    Discharge Orders      Activity    Activity as tolerated     Reason for your hospital stay    Birth and postpartum care     Follow Up (6 weeks)    Follow up with provider in 6 weeks for post-delivery check     Breast pump - Manual/Electric    Breast Pump Documentation:  Manual/Electric Pump: To support adequate breast milk production and nutrition for infant.     I, the undersigned, certify that the above prescribed supplies are medically necessary for this patient and is both reasonable and necessary in reference to accepted standards of medical and necessary in reference to accepted standards of medical practice in the treatment of this patient's condition and is not prescribed as a convenience.     Diet    Resume previous diet     Discharge Medications   Current Discharge Medication List        START taking these medications    Details   senna-docusate (SENOKOT-S/PERICOLACE) 8.6-50 MG tablet Take 1 tablet by mouth 2 times daily as needed for constipation.  Qty: 30 tablet, Refills: 1    Associated Diagnoses: , delivered           CONTINUE these medications which have CHANGED    Details   acetaminophen (TYLENOL) 325 MG tablet Take 2 tablets (650 mg) by mouth every 4 hours as needed for fever or other (second line or per patient preference for mild to moderate pain management).  Qty: 60 tablet, Refills: 1    Associated Diagnoses: , delivered      ibuprofen (ADVIL/MOTRIN) 800 MG tablet Take 1 tablet (800 mg) by mouth every 6 hours as needed for other (first line or per patient preference for mild to moderate pain management).  Qty: 30 tablet, Refills: 1    Associated Diagnoses: , delivered           CONTINUE these medications which have NOT CHANGED    Details   Prenatal Vit-Fe Fumarate-FA (PRENATAL MULTIVITAMIN  PLUS IRON) 27-0.8 MG TABS Take 1 tablet by mouth daily           STOP taking these medications       docusate sodium (COLACE) 100  MG capsule Comments:   Reason for Stopping:             Allergies   No Known Allergies

## 2025-02-14 VITALS
HEART RATE: 73 BPM | DIASTOLIC BLOOD PRESSURE: 48 MMHG | SYSTOLIC BLOOD PRESSURE: 99 MMHG | RESPIRATION RATE: 18 BRPM | WEIGHT: 167 LBS | HEIGHT: 65 IN | TEMPERATURE: 97.6 F | BODY MASS INDEX: 27.82 KG/M2

## 2025-02-14 LAB
ERYTHROCYTE [DISTWIDTH] IN BLOOD BY AUTOMATED COUNT: 22 % (ref 10–15)
HCT VFR BLD AUTO: 34.3 % (ref 35–47)
HGB BLD-MCNC: 10.7 G/DL (ref 11.7–15.7)
MCH RBC QN AUTO: 26.2 PG (ref 26.5–33)
MCHC RBC AUTO-ENTMCNC: 31.2 G/DL (ref 31.5–36.5)
MCV RBC AUTO: 84 FL (ref 78–100)
PLATELET # BLD AUTO: 116 10E3/UL (ref 150–450)
RBC # BLD AUTO: 4.09 10E6/UL (ref 3.8–5.2)
WBC # BLD AUTO: 6.7 10E3/UL (ref 4–11)

## 2025-02-14 PROCEDURE — 85041 AUTOMATED RBC COUNT: CPT | Performed by: OBSTETRICS & GYNECOLOGY

## 2025-02-14 PROCEDURE — 250N000013 HC RX MED GY IP 250 OP 250 PS 637: Performed by: OBSTETRICS & GYNECOLOGY

## 2025-02-14 PROCEDURE — 36415 COLL VENOUS BLD VENIPUNCTURE: CPT | Performed by: OBSTETRICS & GYNECOLOGY

## 2025-02-14 RX ADMIN — IBUPROFEN 800 MG: 800 TABLET, FILM COATED ORAL at 09:56

## 2025-02-14 RX ADMIN — PSYLLIUM HUSK 1 PACKET: 3.4 POWDER ORAL at 08:58

## 2025-02-14 RX ADMIN — PRENATAL VITAMINS-IRON FUMARATE 27 MG IRON-FOLIC ACID 0.8 MG TABLET 1 TABLET: at 08:58

## 2025-02-14 ASSESSMENT — ACTIVITIES OF DAILY LIVING (ADL)
ADLS_ACUITY_SCORE: 24

## 2025-02-14 NOTE — DISCHARGE INSTRUCTIONS
"Postpartum Care at Home With Your Baby: Care Instructions  Overview     After childbirth (postpartum period), your body goes through many changes as you recover. In these weeks after delivery, try to take good care of yourself. Get rest whenever you can and accept help from others.  It may take 4 to 6 weeks to feel like yourself again, and possibly longer if you had a  birth. You may feel sore or very tired as you recover. After delivery, you may continue to have contractions as the uterus returns to the size it was before your pregnancy. You will also have some vaginal bleeding. And you may have pain around the vagina as you heal. Several days after delivery you may also have pain and swelling in your breasts as they fill with milk. There are things you can do at home to help ease these discomforts.  After childbirth, it's common to feel emotional. You may feel irritable, cry easily, and feel happy one minute and sad the next. This is called the \"baby blues.\" Hormone changes are one cause of these emotional changes. These feelings usually get better within a couple of weeks. If they don't, talk to your doctor or midwife.  In the first couple of weeks after you give birth, your doctor or midwife may want to check in with you and make a plan for follow-up care. You will likely have a complete postpartum visit in the first 3 months after delivery. At that time, your doctor or midwife will check on your recovery and see how you're doing. But if you have questions or concerns before then, you can always call your doctor or midwife.  Follow-up care is a key part of your treatment and safety. Be sure to make and go to all appointments, and call your doctor if you are having problems. It's also a good idea to know your test results and keep a list of the medicines you take.  How can you care for yourself at home?  Taking care of your body  Use pads instead of tampons for bleeding. After birth, you will have bloody " vaginal discharge. You may also pass some blood clots that shouldn't be bigger than an egg. Over the next 6 weeks or so, your bleeding should decrease a little every day and slowly change to a pinkish and then whitish discharge.  For cramps or mild pain, try an over-the-counter pain medicine, such as acetaminophen (Tylenol) or ibuprofen (Advil, Motrin). Read and follow all instructions on the label.  To ease pain around the vagina or from hemorrhoids:  Put ice or a cold pack on the area for 10 to 20 minutes at a time. Put a thin cloth between the ice and your skin.  Try sitting in a few inches of warm water (sitz bath) when you can or after bowel movements.  Clean yourself with a gentle squeeze of warm water from a bottle instead of wiping with toilet paper.  Use witch hazel or hemorrhoid pads (such as Tucks).  Try using a cold compress for sore and swollen breasts. And wear a supportive bra that fits.  Ease constipation by drinking plenty of fluids and eating high-fiber foods. Ask your doctor or midwife about over-the-counter stool softeners.  Activity  Rest when you can.  Ask for help from family or friends when you need it.  If you can, have another adult in your home for at least 2 or 3 days after birth.  When you feel ready, try to get some exercise every day. For many people, walking is a good choice. Don't do any heavy exercise until your doctor or midwife says it's okay.  Ask your doctor or midwife when it is okay to have vaginal sex.  If you don't want to get pregnant, talk to your doctor or midwife about birth control options. You can get pregnant even before your period returns. Also, you can get pregnant while you are breastfeeding.  Talk to your doctor or midwife if you want to get pregnant again. They can talk to you about when it is safe.  Emotional health  It's normal to have some sadness, anxiety, and mood swings after delivery. It may help to talk with a trusted friend or family member. You can  also call the Maternal Mental Health Hotline at 7-123-SYT-MAMA (1-134.422.4024) for support. If these mood changes last more than a couple of weeks, talk to your doctor or midwife.  When should you call for help?  Share this information with your partner, family, or a friend. They can help you watch for warning signs.  Call 911  anytime you think you may need emergency care. For example, call if:    You feel you cannot stop from hurting yourself, your baby, or someone else.     You passed out (lost consciousness).     You have chest pain, are short of breath, or cough up blood.     You have a seizure.   Where to get help 24 hours a day, 7 days a week   If you or someone you know talks about suicide, self-harm, a mental health crisis, a substance use crisis, or any other kind of emotional distress, get help right away. You can:    Call the Suicide and Crisis Lifeline at 988.     Call 6-292-225-TALK (1-431.634.9423).     Text HOME to 427384 to access the Crisis Text Line.   Consider saving these numbers in your phone.  Go to [employer] for more information or to chat online.  Call your doctor or midwife now or seek immediate medical care if:    You have signs of hemorrhage (too much bleeding), such as:  Heavy vaginal bleeding. This means that you are soaking through one or more pads in an hour. Or you pass blood clots bigger than an egg.  Feeling dizzy or lightheaded, or you feel like you may faint.  Feeling so tired or weak that you cannot do your usual activities.  A fast or irregular heartbeat.  New or worse belly pain.     You have signs of infection, such as:  A fever.  Increased pain, swelling, warmth, or redness from an incision or wound.  Frequent or painful urination or blood in your urine.  Vaginal discharge that smells bad.  New or worse belly pain.     You have symptoms of a blood clot in your leg (called a deep vein thrombosis), such as:  Pain in the calf, back of the knee, thigh, or groin.  Swelling  "in the leg or groin.  A color change on the leg or groin. The skin may be reddish or purplish, depending on your usual skin color.     You have signs of preeclampsia, such as:  Sudden swelling of your face, hands, or feet.  New vision problems (such as dimness, blurring, or seeing spots).  A severe headache.     You have signs of heart failure, such as:  New or increased shortness of breath.  New or worse swelling in your legs, ankles, or feet.  Sudden weight gain, such as more than 2 to 3 pounds in a day or 5 pounds in a week.  Feeling so tired or weak that you cannot do your usual activities.     You had spinal or epidural pain relief and have:  New or worse back pain.  Increased pain, swelling, warmth, or redness at the injection site.  Tingling, weakness, or numbness in your legs or groin.   Watch closely for changes in your health, and be sure to contact your doctor or midwife if:    Your vaginal bleeding isn't decreasing.     You feel sad, anxious, or hopeless for more than a few days.     You are having problems with your breasts or breastfeeding.   Where can you learn more?  Go to https://[email or web address].[employer].net/patiented  Enter Z768 in the search box to learn more about \"Postpartum Care at Home With Your Baby: Care Instructions.\"  Current as of: April 30, 2024  Content Version: 14.3    2024 [employer].   Care instructions adapted under license by your healthcare professional. If you have questions about a medical condition or this instruction, always ask your healthcare professional. [employer] disclaims any warranty or liability for your use of this information.    "

## 2025-02-14 NOTE — PLAN OF CARE
Data: Vital signs within normal limits. Postpartum checks within normal limits - see flow record. Patient eating and drinking normally. Patient able to empty bladder independently and is up ambulating. No apparent signs of infection. Patient performing self cares, is able to care for infant and is breast and bottle feeding every 2-3 hours.   Laceration  appears within normal. Is using no pain medication for any discomfort.    Action: Patient education done, see flow record.  Response: Positive attachment behaviors observed with infant. Patient's family not present this shift.   Plan: Continue current plan of care.  Anticipate discharge on 2/14.      Goal Outcome Evaluation:      Plan of Care Reviewed With: patient    Overall Patient Progress: improvingOverall Patient Progress: improving       Problem: Adult Inpatient Plan of Care  Goal: Plan of Care Review  Description: The Plan of Care Review/Shift note should be completed every shift.  The Outcome Evaluation is a brief statement about your assessment that the patient is improving, declining, or no change.  This information will be displayed automatically on your shift  note.  Outcome: Progressing  Flowsheets (Taken 2/14/2025 0541)  Plan of Care Reviewed With: patient  Overall Patient Progress: improving  Goal: Absence of Hospital-Acquired Illness or Injury  Intervention: Prevent Skin Injury  Recent Flowsheet Documentation  Taken 2/14/2025 0300 by Jamar Elena RN  Body Position: position changed independently  Intervention: Prevent Infection  Recent Flowsheet Documentation  Taken 2/14/2025 0300 by Jamar Elena RN  Infection Prevention: hand hygiene promoted  Goal: Optimal Comfort and Wellbeing  Intervention: Provide Person-Centered Care  Recent Flowsheet Documentation  Taken 2/14/2025 0300 by Jamar Elena RN  Trust Relationship/Rapport:   care explained   choices provided   questions answered   questions encouraged     Problem:  "Labor  Goal: Stable Fetal Wellbeing  Intervention: Promote and Monitor Fetal Wellbeing  Recent Flowsheet Documentation  Taken 2/14/2025 0300 by Jamar Elena RN  Body Position: position changed independently  Goal: Absence of Infection Signs and Symptoms  Intervention: Prevent or Manage Infection  Recent Flowsheet Documentation  Taken 2/14/2025 0300 by Jamar Elena RN  Infection Prevention: hand hygiene promoted     Problem: Adult Inpatient Plan of Care  Goal: Plan of Care Review  Description: The Plan of Care Review/Shift note should be completed every shift.  The Outcome Evaluation is a brief statement about your assessment that the patient is improving, declining, or no change.  This information will be displayed automatically on your shift  note.  Outcome: Progressing  Flowsheets (Taken 2/14/2025 0541)  Plan of Care Reviewed With: patient  Overall Patient Progress: improving  Goal: Patient-Specific Goal (Individualized)  Description: You can add care plan individualizations to a care plan. Examples of Individualization might be:  \"Parent requests to be called daily at 9am for status\", \"I have a hard time hearing out of my right ear\", or \"Do not touch me to wake me up as it startles  me\".  Outcome: Progressing  Goal: Absence of Hospital-Acquired Illness or Injury  Outcome: Progressing  Intervention: Prevent Skin Injury  Recent Flowsheet Documentation  Taken 2/14/2025 0300 by Jamar Elena RN  Body Position: position changed independently  Intervention: Prevent Infection  Recent Flowsheet Documentation  Taken 2/14/2025 0300 by Jamar Elena RN  Infection Prevention: hand hygiene promoted  Goal: Optimal Comfort and Wellbeing  Outcome: Progressing  Intervention: Provide Person-Centered Care  Recent Flowsheet Documentation  Taken 2/14/2025 0300 by Jamar Elena RN  Trust Relationship/Rapport:   care explained   choices provided   questions answered   questions " encouraged  Goal: Readiness for Transition of Care  Outcome: Progressing     Problem: Postpartum (Vaginal Delivery)  Goal: Successful Parent Role Transition  Outcome: Progressing  Goal: Hemostasis  Outcome: Progressing  Goal: Absence of Infection Signs and Symptoms  Outcome: Progressing  Goal: Anesthesia/Sedation Recovery  Outcome: Progressing  Goal: Optimal Pain Control and Function  Outcome: Progressing  Goal: Effective Urinary Elimination  Outcome: Progressing

## 2025-02-14 NOTE — PLAN OF CARE
"Goal Outcome Evaluation:      Plan of Care Reviewed With: patient, spouse    Overall Patient Progress: improvingOverall Patient Progress: improving    Outcome Evaluation: stable for discharge    Vital signs within normal limits. Postpartum checks within normal limits - see flow record. Patient eating and drinking normally. Patient able to empty bladder independently and is up ambulating. Patient performing self cares, is able to care for infant and is breast and bottle feeding every 2-3 hours. Patient medicated with ibuprofen and tylenol during the shift for pain. See MAR. Adequate pain control noted by patient. Patient education done, see flow record. Positive attachment behaviors observed with infant. Patient's spouse present this shift.     Discharge instructions completed.  Patient states she understands all discharge instructions and all her questions have been answered.  Verbalizes when she needs to return to clinic for follow up for herself and baby.  She is caring for herself and her baby independently.  Prescriptions reviewed and sent to pharmacy.  Postpartum depression symptoms reviewed and encouraged frequent review of depression scale. Patient discharged home with family transporting at 1145.      Problem: Adult Inpatient Plan of Care  Goal: Plan of Care Review  Description: The Plan of Care Review/Shift note should be completed every shift.  The Outcome Evaluation is a brief statement about your assessment that the patient is improving, declining, or no change.  This information will be displayed automatically on your shift  note.  Outcome: Adequate for Care Transition  Flowsheets (Taken 2/14/2025 1149)  Outcome Evaluation: stable for discharge  Plan of Care Reviewed With:   patient   spouse  Overall Patient Progress: improving  Goal: Patient-Specific Goal (Individualized)  Description: You can add care plan individualizations to a care plan. Examples of Individualization might be:  \"Parent requests to " "be called daily at 9am for status\", \"I have a hard time hearing out of my right ear\", or \"Do not touch me to wake me up as it startles  me\".  Outcome: Adequate for Care Transition  Goal: Absence of Hospital-Acquired Illness or Injury  Outcome: Adequate for Care Transition  Intervention: Prevent Skin Injury  Recent Flowsheet Documentation  Taken 2/14/2025 0856 by Sherrie Fish RN  Body Position: supine, head elevated  Intervention: Prevent Infection  Recent Flowsheet Documentation  Taken 2/14/2025 0856 by Sherrie Fish RN  Infection Prevention: hand hygiene promoted  Goal: Optimal Comfort and Wellbeing  Outcome: Adequate for Care Transition  Intervention: Provide Person-Centered Care  Recent Flowsheet Documentation  Taken 2/14/2025 0856 by Sherrie Fish RN  Trust Relationship/Rapport:   care explained   choices provided   questions answered   questions encouraged   emotional support provided   empathic listening provided   reassurance provided  Goal: Readiness for Transition of Care  Outcome: Adequate for Care Transition  Flowsheets (Taken 2/14/2025 1047)  Anticipated Changes Related to Illness: none  Concerns to be Addressed: all concerns addressed in this encounter  Barriers to Discharge: none  Intervention: Mutually Develop Transition Plan  Recent Flowsheet Documentation  Taken 2/14/2025 1047 by Sherrie Fish RN  Discharge Coordination/Progress: done  Anticipated Changes Related to Illness: none  Concerns to be Addressed: all concerns addressed in this encounter     Problem: Labor  Goal: Stable Fetal Wellbeing  Intervention: Promote and Monitor Fetal Wellbeing  Recent Flowsheet Documentation  Taken 2/14/2025 0856 by Sherrie Fish RN  Body Position: supine, head elevated  Goal: Absence of Infection Signs and Symptoms  Intervention: Prevent or Manage Infection  Recent Flowsheet Documentation  Taken 2/14/2025 0856 by Sherrie Fish RN  Infection Prevention: hand hygiene promoted   "   Problem: Postpartum (Vaginal Delivery)  Goal: Successful Parent Role Transition  Outcome: Adequate for Care Transition  Goal: Hemostasis  Outcome: Adequate for Care Transition  Goal: Absence of Infection Signs and Symptoms  Outcome: Adequate for Care Transition  Goal: Anesthesia/Sedation Recovery  Outcome: Adequate for Care Transition  Goal: Optimal Pain Control and Function  Outcome: Adequate for Care Transition  Goal: Effective Urinary Elimination  Outcome: Adequate for Care Transition

## 2025-02-14 NOTE — PROGRESS NOTES
[facility]   Post-partum Note    Name:  Hiram Page  MRN: [id number]    S: Patient states she is doing well, no concerns.  Pain is well controlled.  Tolerating regular diet without nausea or vomiting. Voiding spontaneously, passing flatus but no bowel movement as of yet.   Ambulating without dizziness.  Lochia similar to heavy menses.  Breast feeding with formula supplementation.  Plans discharge today.    O:   Patient Vitals for the past 24 hrs:   BP Temp Temp src Pulse Resp   25 0856 99/48 97.6  F (36.4  C) Oral 73 18   25 0300 107/50 97.6  F (36.4  C) Oral 55 --   25 1556 101/47 97.9  F (36.6  C) Axillary -- 16   25 1432 108/47 98  F (36.7  C) Axillary 62 18   25 0934 109/46 97.8  F (36.6  C) Axillary 69 18     Gen:  Resting comfortably, NAD  CV:  Regular rate  Pulm:  Non-labored breathing.  No cough or wheezing.   Abd:  Soft, appropriately tender to palpation, non-distended.  Fundus at -1 umbilicus, firm and non-tender.  Ext:  Non-tender, trace LE edema b/l    Assessment/Plan:  44 year old  on PPD #2 s/p  (fifth).  Continue with routine postpartum management.     gTP:   - Platelets 117>96k on   - CBC with diff at 6 week PP visit     Pain: Well-controlled with ibuprofen and tylenol  Hgb: 10.7>EBL 25cc> 10.3. VSS as noted above, asymptomatic.   GI:  BID Senna/Colace.  PRN Simethicone.   PPx:  Encouraged ambulation   Rh: Positive   Rubella: Immune  Feed: Breast  BC: Declined conversations prenatal; information placed in AVS for discussion at PP visit  Dispo: Plan for home today.     Monserrat Angelo MD   Pager: [phone]   2025